# Patient Record
Sex: FEMALE | Race: WHITE | NOT HISPANIC OR LATINO
[De-identification: names, ages, dates, MRNs, and addresses within clinical notes are randomized per-mention and may not be internally consistent; named-entity substitution may affect disease eponyms.]

---

## 2020-07-13 ENCOUNTER — APPOINTMENT (OUTPATIENT)
Dept: GASTROENTEROLOGY | Facility: CLINIC | Age: 50
End: 2020-07-13

## 2020-10-30 ENCOUNTER — APPOINTMENT (OUTPATIENT)
Dept: INTERNAL MEDICINE | Facility: CLINIC | Age: 50
End: 2020-10-30

## 2020-11-20 ENCOUNTER — NON-APPOINTMENT (OUTPATIENT)
Age: 50
End: 2020-11-20

## 2020-11-20 ENCOUNTER — APPOINTMENT (OUTPATIENT)
Dept: INTERNAL MEDICINE | Facility: CLINIC | Age: 50
End: 2020-11-20
Payer: COMMERCIAL

## 2020-11-20 VITALS
OXYGEN SATURATION: 96 % | SYSTOLIC BLOOD PRESSURE: 136 MMHG | HEIGHT: 64 IN | DIASTOLIC BLOOD PRESSURE: 70 MMHG | WEIGHT: 139.5 LBS | HEART RATE: 71 BPM | BODY MASS INDEX: 23.82 KG/M2 | TEMPERATURE: 97.6 F | RESPIRATION RATE: 16 BRPM

## 2020-11-20 DIAGNOSIS — Z72.89 OTHER PROBLEMS RELATED TO LIFESTYLE: ICD-10-CM

## 2020-11-20 DIAGNOSIS — Z20.828 CONTACT WITH AND (SUSPECTED) EXPOSURE TO OTHER VIRAL COMMUNICABLE DISEASES: ICD-10-CM

## 2020-11-20 DIAGNOSIS — Z86.03 PERSONAL HISTORY OF NEOPLASM OF UNCERTAIN BEHAVIOR: ICD-10-CM

## 2020-11-20 PROCEDURE — 93000 ELECTROCARDIOGRAM COMPLETE: CPT | Mod: 59

## 2020-11-20 PROCEDURE — G0442 ANNUAL ALCOHOL SCREEN 15 MIN: CPT | Mod: NC

## 2020-11-20 PROCEDURE — 36415 COLL VENOUS BLD VENIPUNCTURE: CPT

## 2020-11-20 PROCEDURE — 99396 PREV VISIT EST AGE 40-64: CPT | Mod: 25

## 2020-11-22 PROBLEM — Z86.03 HISTORY OF NEOPLASM OF BLADDER: Status: RESOLVED | Noted: 2020-11-20 | Resolved: 2020-11-22

## 2020-11-22 PROBLEM — Z72.89 CONSUMES ALCOHOL: Status: ACTIVE | Noted: 2020-11-20

## 2020-11-22 NOTE — HISTORY OF PRESENT ILLNESS
[FreeTextEntry1] : annual wellness visit  [de-identified] : Patient is a 50 year old female with a past medical history as below who presents for an annual wellness visit. Patient is not currently taking any prescription medications, but is taking Vitamin D (400 IU) and a Multivitamin. Patient has seen [facility] in the past year. She is up-to-date with annual breast imaging (mammogram and breast US). Patient states screening colonoscopy is scheduled for early December with gastroenterologist, Dr. Storey. Patient notes right foot, 3rd digit pain that started 1 year ago. She has had imaging of the right foot (X-Ray/MRI) per an orthopedic foot specialist. The pain was diagnosed as secondary to a stress fracture and avascular necrosis. She has been using an orthopedic boot. Patient intermittently notes back pain secondary to lumbar disc herniation. She states PT has helped. Patient notes joint pain/swelling, most noticeably in her hands and ankles. Patient states she maintains a well-balanced diet and maintains an active lifestyle (yoga). Patient is not interested in receiving the flu vaccine or Tdap (Adacel) Vaccine today. She inquires about testing for COVID-19 antibodies with blood work today.

## 2020-11-22 NOTE — ASSESSMENT
[FreeTextEntry1] : Patient is a 50 year old female with a past medical history as above who presents for an annual wellness visit.

## 2020-11-22 NOTE — REVIEW OF SYSTEMS
[Negative] : Heme/Lymph [Joint Pain] : joint pain [Back Pain] : back pain [Joint Swelling] : joint swelling [FreeTextEntry9] : right foot, 3rd digit pain

## 2020-11-22 NOTE — ADDENDUM
[FreeTextEntry1] : I, Alvaro Byrd, acted solely as scribe for Dr. Parminder Sales DO on this date 11/20/2020 11:10AM .\par \par All medical record entries made by the Scribe were at my, Dr. Parminder Sales DO direction and personally dictated by me on 11/20/2020 11:10AM. I have reviewed the chart and agree that the record accurately reflects my personal performance of the history, physical exam, assessment and plan. I have also personally directed, reviewed and agreed with the chart.\par

## 2020-11-22 NOTE — HEALTH RISK ASSESSMENT
[Yes] : Yes [2 - 3 times a week (3 pts)] : 2 - 3  times a week (3 points) [1 or 2 (0 pts)] : 1 or 2 (0 points) [Never (0 pts)] : Never (0 points) [No] : In the past 12 months have you used drugs other than those required for medical reasons? No [0] : 2) Feeling down, depressed, or hopeless: Not at all (0) [] : No [Audit-CScore] : 3 [de-identified] : Yoga, active lifestyle.  [de-identified] : Regular.  [[id number]] : 0 [MammogramComments] : Results of last mammogram and breast US to be requested. [ColonoscopyComments] : Scheduled with Dr. Storey.

## 2020-11-22 NOTE — PLAN
[FreeTextEntry1] : Pulmonary\par former smoker - check EKG (results as above) - continue smoking cessation\par Gynecology\par Results of last mammogram and breast US to be requested\par Gastroenterology\par screening colonoscopy - patient to follow up with gastroenterologist, Dr. Storey next month \par Musculoskeletal\par right foot, 3rd digit pain - continue using orthopedic boot - continue to follow up with orthopedist \par Immunization\par flu vaccine - discussed, refused\par Tdap (Adacel) Vaccine - discussed, refused\par Infectious Disease\par check COVID-19 IgG antibody\par \par check EKG (results as above), female panel, hemoglobin A1C, COVID-19 IgG antibody, and UA

## 2020-11-29 LAB
25(OH)D3 SERPL-MCNC: 51.6 NG/ML
ALBUMIN SERPL ELPH-MCNC: 4.6 G/DL
ALP BLD-CCNC: 53 U/L
ALT SERPL-CCNC: 13 U/L
ANION GAP SERPL CALC-SCNC: 10 MMOL/L
APPEARANCE: CLEAR
AST SERPL-CCNC: 16 U/L
BASOPHILS # BLD AUTO: 0.05 K/UL
BASOPHILS NFR BLD AUTO: 1 %
BILIRUB SERPL-MCNC: 0.5 MG/DL
BILIRUBIN URINE: NEGATIVE
BLOOD URINE: NORMAL
BUN SERPL-MCNC: 19 MG/DL
CALCIUM SERPL-MCNC: 9.6 MG/DL
CHLORIDE SERPL-SCNC: 101 MMOL/L
CHOLEST SERPL-MCNC: 270 MG/DL
CO2 SERPL-SCNC: 27 MMOL/L
COLOR: YELLOW
CREAT SERPL-MCNC: 0.68 MG/DL
EOSINOPHIL # BLD AUTO: 0.09 K/UL
EOSINOPHIL NFR BLD AUTO: 1.8 %
ESTIMATED AVERAGE GLUCOSE: 103 MG/DL
GLUCOSE QUALITATIVE U: NEGATIVE
GLUCOSE SERPL-MCNC: 93 MG/DL
HBA1C MFR BLD HPLC: 5.2 %
HCT VFR BLD CALC: 41.7 %
HDLC SERPL-MCNC: 89 MG/DL
HGB BLD-MCNC: 13.6 G/DL
IMM GRANULOCYTES NFR BLD AUTO: 0.4 %
KETONES URINE: NEGATIVE
LDLC SERPL CALC-MCNC: 173 MG/DL
LEUKOCYTE ESTERASE URINE: NEGATIVE
LYMPHOCYTES # BLD AUTO: 1.83 K/UL
LYMPHOCYTES NFR BLD AUTO: 36.9 %
MAN DIFF?: NORMAL
MCHC RBC-ENTMCNC: 31.9 PG
MCHC RBC-ENTMCNC: 32.6 GM/DL
MCV RBC AUTO: 97.9 FL
MONOCYTES # BLD AUTO: 0.46 K/UL
MONOCYTES NFR BLD AUTO: 9.3 %
NEUTROPHILS # BLD AUTO: 2.51 K/UL
NEUTROPHILS NFR BLD AUTO: 50.6 %
NITRITE URINE: NEGATIVE
NONHDLC SERPL-MCNC: 181 MG/DL
PH URINE: 6.5
PLATELET # BLD AUTO: 414 K/UL
POTASSIUM SERPL-SCNC: 4.4 MMOL/L
PROT SERPL-MCNC: 7.1 G/DL
PROTEIN URINE: NEGATIVE
RBC # BLD: 4.26 M/UL
RBC # FLD: 14.7 %
SARS-COV-2 IGG SERPL IA-ACNC: 0.1 INDEX
SARS-COV-2 IGG SERPL QL IA: NEGATIVE
SODIUM SERPL-SCNC: 138 MMOL/L
SPECIFIC GRAVITY URINE: 1.02
TRIGL SERPL-MCNC: 43 MG/DL
TSH SERPL-ACNC: 2.42 UIU/ML
UROBILINOGEN URINE: NORMAL
WBC # FLD AUTO: 4.96 K/UL

## 2020-12-01 ENCOUNTER — APPOINTMENT (OUTPATIENT)
Dept: GASTROENTEROLOGY | Facility: CLINIC | Age: 50
End: 2020-12-01
Payer: COMMERCIAL

## 2020-12-01 ENCOUNTER — NON-APPOINTMENT (OUTPATIENT)
Age: 50
End: 2020-12-01

## 2020-12-01 VITALS
BODY MASS INDEX: 23.56 KG/M2 | DIASTOLIC BLOOD PRESSURE: 80 MMHG | OXYGEN SATURATION: 100 % | WEIGHT: 138 LBS | HEART RATE: 66 BPM | SYSTOLIC BLOOD PRESSURE: 127 MMHG | HEIGHT: 64 IN

## 2020-12-01 DIAGNOSIS — M87.00 IDIOPATHIC ASEPTIC NECROSIS OF UNSPECIFIED BONE: ICD-10-CM

## 2020-12-01 PROCEDURE — 99072 ADDL SUPL MATRL&STAF TM PHE: CPT

## 2020-12-01 PROCEDURE — 99204 OFFICE O/P NEW MOD 45 MIN: CPT

## 2020-12-01 NOTE — PHYSICAL EXAM
[General Appearance - Alert] : alert [General Appearance - In No Acute Distress] : in no acute distress [General Appearance - Well Nourished] : well nourished [General Appearance - Well Developed] : well developed [Sclera] : the sclera and conjunctiva were normal [Extraocular Movements] : extraocular movements were intact [Outer Ear] : the ears and nose were normal in appearance [Both Tympanic Membranes Were Examined] : both tympanic membranes were normal [Neck Appearance] : the appearance of the neck was normal [Neck Cervical Mass (___cm)] : no neck mass was observed [Auscultation Breath Sounds / Voice Sounds] : lungs were clear to auscultation bilaterally [Heart Rate And Rhythm] : heart rate was normal and rhythm regular [Heart Sounds] : normal S1 and S2 [Heart Sounds Gallop] : no gallops [Murmurs] : no murmurs [Heart Sounds Pericardial Friction Rub] : no pericardial rub [Bowel Sounds] : normal bowel sounds [Abdomen Soft] : soft [Abdomen Tenderness] : non-tender [Abdomen Mass (___ Cm)] : no abdominal mass palpated [Cervical Lymph Nodes Enlarged Posterior Bilaterally] : posterior cervical [Cervical Lymph Nodes Enlarged Anterior Bilaterally] : anterior cervical [Supraclavicular Lymph Nodes Enlarged Bilaterally] : supraclavicular [Abnormal Walk] : normal gait [Nail Clubbing] : no clubbing  or cyanosis of the fingernails [Skin Color & Pigmentation] : normal skin color and pigmentation [] : no rash [Oriented To Time, Place, And Person] : oriented to person, place, and time [Impaired Insight] : insight and judgment were intact [Affect] : the affect was normal

## 2020-12-01 NOTE — ASSESSMENT
[FreeTextEntry1] : IMPRESSION: \par 1.  Screening colonoscopy - here for evaluation\par 2.  Has never had a colonoscopy or other screening test  \par 3.  No digestive cancers in the family\par 4.  Hx of bladder tumor in her 30s\par \par \par PLAN: \par I had a long discuss with the patient regarding colon cancer in the [place].    \par \par We reviewed risk factors for colon cancer which include age, having a family hx of colon cancer, cancer syndromes, personal hx of inflammatory disease, having comorbidities such as obesity, DM, cardiac disease, having nicotine addiction, alcohol addiction, etc.  \par \par We reviewed the screening tests for colon cancer prevention.  We discussed screening tests such as stool test, CT scans such as CT colonography, and a colonoscopy.    Patient was made aware that despite these screening test, a cancerous lesion can still be missed.  The gold standard test is a screening colonoscopy.\par \par I have advised the patient to arrange for a colonoscopy to rule out colon polyps, colorectal cancer etc. under monitored anesthesia care.  Risks such as perforation  (4 in 10,000) requiring surgery, bleeding (8 in 10,000), infection, diverticulitis, colitis, missed colon cancer (2% to 6%), internal organ injury, etc, risks of bowel prep including colitis, syncope, adverse reaction to medication etc. and risks of anesthesia including cardiopulmonary compromise were discussed with patient.  Patient verbalized understanding and agrees to proceed with the planned procedure.\par

## 2020-12-01 NOTE — HISTORY OF PRESENT ILLNESS
[de-identified] : 51 y/o woman with hx of bladder tumor in her 30 s/p resection who presents for a colonoscopy. \par \par No colonoscopy in past. \par \par No abdominal pain.\par No constipation. \par No diarrhea. \par No change in bowel habits. \par No melena and n hematochezia. \par No loss of appetite, no loss of weight. \par \par No heartburn\par No dysphagia. \par No odynophagia.  \par No jaundice. \par \par \par \par Brother had Prostate cancer.  Father had liver disease but unsure etiology.  Patient denies family history of digestive cancers.\par \par Third toe on right foot painful.   Has arthritis - painful joints, back pain.  Feels stiff in joints All other review of systems are negative.  Denies cardiac symptoms.\par

## 2021-02-11 ENCOUNTER — LABORATORY RESULT (OUTPATIENT)
Age: 51
End: 2021-02-11

## 2021-02-11 ENCOUNTER — APPOINTMENT (OUTPATIENT)
Dept: RHEUMATOLOGY | Facility: CLINIC | Age: 51
End: 2021-02-11
Payer: COMMERCIAL

## 2021-02-11 VITALS
DIASTOLIC BLOOD PRESSURE: 90 MMHG | OXYGEN SATURATION: 99 % | WEIGHT: 140 LBS | BODY MASS INDEX: 23.9 KG/M2 | HEIGHT: 64 IN | SYSTOLIC BLOOD PRESSURE: 130 MMHG | HEART RATE: 73 BPM

## 2021-02-11 PROCEDURE — 99204 OFFICE O/P NEW MOD 45 MIN: CPT | Mod: 25

## 2021-02-11 PROCEDURE — 36415 COLL VENOUS BLD VENIPUNCTURE: CPT

## 2021-02-11 PROCEDURE — 99072 ADDL SUPL MATRL&STAF TM PHE: CPT

## 2021-02-12 LAB
C3 SERPL-MCNC: 97 MG/DL
C4 SERPL-MCNC: 24 MG/DL
CENTROMERE IGG SER-ACNC: <0.2 CD:130001892
CRP SERPL-MCNC: <0.1 MG/DL
ENA RNP AB SER IA-ACNC: 1.6 AL
ENA SCL70 IGG SER IA-ACNC: <0.2 AL
ENA SM AB SER IA-ACNC: <0.2 AL
ERYTHROCYTE [SEDIMENTATION RATE] IN BLOOD BY WESTERGREN METHOD: 18 MM/HR
RHEUMATOID FACT SER QL: 11 IU/ML
TSH SERPL-ACNC: 2.56 UIU/ML

## 2021-02-13 LAB
ANA SER IF-ACNC: NEGATIVE
B BURGDOR IGG+IGM SER QL IB: NORMAL
CCP AB SER IA-ACNC: 89 UNITS
DSDNA AB SER-ACNC: <12 IU/ML
RF+CCP IGG SER-IMP: ABNORMAL

## 2021-02-15 NOTE — HISTORY OF PRESENT ILLNESS
[Anorexia] : no anorexia [FreeTextEntry1] : 49 yo here for polyarthralgias\par \par 2018 - age 38 - started to feel pain in the fingers.   also developed mild swelling in the right middle finger.  ortho treated it with cortisone shot with little improvement.   has lived since then with pain and joint stiffness.  Stiffness in the joint is in the AM mostly.  occasionally gets back pain, but not prolonged in the aM\par -  other joints without issues or pain except for foot.   was exercising on a bike and hit the toe.   was treated with NSAID for 3 months as well as steroids - without improvement.   Was subsequently found to have a stress fracture in the toe and chronic AVN.    Was treated with prolonged boot - 6 weeks.  just had it removed a few days ago - and now the swelling is gone. 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\par - has had back pain since young - \par - the middle fingers are more swollen - sometimes it gets stuck\par \par Rheum ROS\par - denies dactylitis in other joints, no toe swelling\par - denies heat or redness in the joints \par - no h/o psoriasis, IBD, STD, infectious diarrhea or inflammatory eye disease\par - endorses 1 episode of white/blue color begum of the middle finger - it was cold - quickly rewarmed\par - no photosensitivity, oral ulcers, pulmonary complaints or frothy urine\par - strong family history of autoimmunity (mom)\par -  - all three first trimester miscarriage - no heart beat at 10 weeks - - delivery  okay - no preeclampsia - \par \par PMHX\par - bladder cyst age 30 - cancerous and surgically remmoved -  never needed chemo or radiaon\par \par PSHX\par \par \par  [Weight Loss] : no weight loss [Malaise] : no malaise [Fever] : no fever [Fatigue] : no fatigue [Depression] : no depression [Malar Facial Rash] : no malar facial rash [Skin Lesions] : no lesions [Oral Ulcers] : no oral ulcers [Dry Mouth] : no dry mouth [Chest Pain] : no chest pain [Shortness of Breath] : no shortness of breath [Joint Warmth] : no joint warmth [Arthralgias] : arthralgias [Difficulty Standing] : no difficulty standing [Muscle Spasms] : no muscle spasms [Eye Pain] : no eye pain [Eye Redness] : no eye redness [Dry Eyes] : no dry eyes

## 2021-02-15 NOTE — ASSESSMENT
[FreeTextEntry1] : 51 yo family h/o autoimmunity (SLE< RA< PsA) here with polyarthralgias and ? dacytlitis\par \par #finger pain and swelling - chronic.  ? 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

## 2021-02-15 NOTE — PHYSICAL EXAM
[General Appearance - Alert] : alert [General Appearance - In No Acute Distress] : in no acute distress [General Appearance - Well Nourished] : well nourished [General Appearance - Well-Appearing] : healthy appearing [General Appearance - Well Developed] : well developed [Oropharynx] : the oropharynx was normal [Outer Ear] : the ears and nose were normal in appearance [Neck Appearance] : the appearance of the neck was normal [Neck Cervical Mass (___cm)] : no neck mass was observed [Jugular Venous Distention Increased] : there was no jugular-venous distention [Thyroid Diffuse Enlargement] : the thyroid was not enlarged [Thyroid Nodule] : there were no palpable thyroid nodules [Auscultation Breath Sounds / Voice Sounds] : lungs were clear to auscultation bilaterally [Heart Rate And Rhythm] : heart rate was normal and rhythm regular [Heart Sounds Gallop] : no gallops [Heart Sounds] : normal S1 and S2 [Murmurs] : no murmurs [Heart Sounds Pericardial Friction Rub] : no pericardial rub [Edema] : there was no peripheral edema [Full Pulse] : the pedal pulses are present [Cervical Lymph Nodes Enlarged Posterior Bilaterally] : posterior cervical [Cervical Lymph Nodes Enlarged Anterior Bilaterally] : anterior cervical [Supraclavicular Lymph Nodes Enlarged Bilaterally] : supraclavicular [No CVA Tenderness] : no ~M costovertebral angle tenderness [No Spinal Tenderness] : no spinal tenderness [Skin Color & Pigmentation] : normal skin color and pigmentation [FreeTextEntry1] : unable to bend the 3rd finger biltearlly - dactylitis right finger [Skin Turgor] : normal skin turgor [Motor Exam] : the motor exam was normal [] : no rash [No Focal Deficits] : no focal deficits [Oriented To Time, Place, And Person] : oriented to person, place, and time [Affect] : the affect was normal [Impaired Insight] : insight and judgment were intact

## 2021-02-15 NOTE — CONSULT LETTER
[Dear  ___] : Dear  [unfilled], [Courtesy Letter:] : I had the pleasure of seeing your patient, [unfilled], in my office today. [Please see my note below.] : Please see my note below. [Consult Closing:] : Thank you very much for allowing me to participate in the care of this patient.  If you have any questions, please do not hesitate to contact me. [Sincerely,] : Sincerely, [FreeTextEntry2] : Dr. Rodrigues Sayedy

## 2021-02-16 LAB — CK SERPL-CCNC: 127 U/L

## 2021-02-17 ENCOUNTER — APPOINTMENT (OUTPATIENT)
Dept: GASTROENTEROLOGY | Facility: HOSPITAL | Age: 51
End: 2021-02-17

## 2021-02-17 LAB — RNA POLYMERASE III IGG: 4 UNITS

## 2021-02-22 LAB — HLA-B27 RELATED AG QL: NEGATIVE

## 2021-02-23 ENCOUNTER — APPOINTMENT (OUTPATIENT)
Dept: INTERNAL MEDICINE | Facility: CLINIC | Age: 51
End: 2021-02-23

## 2021-03-08 ENCOUNTER — RESULT REVIEW (OUTPATIENT)
Age: 51
End: 2021-03-08

## 2021-03-08 ENCOUNTER — APPOINTMENT (OUTPATIENT)
Dept: MRI IMAGING | Facility: CLINIC | Age: 51
End: 2021-03-08
Payer: COMMERCIAL

## 2021-03-08 ENCOUNTER — OUTPATIENT (OUTPATIENT)
Dept: OUTPATIENT SERVICES | Facility: HOSPITAL | Age: 51
LOS: 1 days | End: 2021-03-08
Payer: COMMERCIAL

## 2021-03-08 DIAGNOSIS — M79.641 PAIN IN RIGHT HAND: ICD-10-CM

## 2021-03-08 PROCEDURE — 73130 X-RAY EXAM OF HAND: CPT | Mod: 26,50

## 2021-03-08 PROCEDURE — 73218 MRI UPPER EXTREMITY W/O DYE: CPT

## 2021-03-08 PROCEDURE — 73218 MRI UPPER EXTREMITY W/O DYE: CPT | Mod: 26,LT,76

## 2021-03-08 PROCEDURE — 73130 X-RAY EXAM OF HAND: CPT

## 2021-03-09 ENCOUNTER — NON-APPOINTMENT (OUTPATIENT)
Age: 51
End: 2021-03-09

## 2021-03-09 ENCOUNTER — APPOINTMENT (OUTPATIENT)
Dept: RHEUMATOLOGY | Facility: CLINIC | Age: 51
End: 2021-03-09
Payer: COMMERCIAL

## 2021-03-09 VITALS
DIASTOLIC BLOOD PRESSURE: 85 MMHG | HEART RATE: 63 BPM | OXYGEN SATURATION: 98 % | SYSTOLIC BLOOD PRESSURE: 125 MMHG | BODY MASS INDEX: 23.9 KG/M2 | HEIGHT: 64 IN | WEIGHT: 140 LBS

## 2021-03-09 PROCEDURE — 99214 OFFICE O/P EST MOD 30 MIN: CPT

## 2021-03-09 PROCEDURE — 99072 ADDL SUPL MATRL&STAF TM PHE: CPT

## 2021-03-09 NOTE — PHYSICAL EXAM
[General Appearance - Alert] : alert [General Appearance - In No Acute Distress] : in no acute distress [General Appearance - Well Nourished] : well nourished [General Appearance - Well Developed] : well developed [General Appearance - Well-Appearing] : healthy appearing [Skin Color & Pigmentation] : normal skin color and pigmentation [Skin Turgor] : normal skin turgor [] : no rash [Oriented To Time, Place, And Person] : oriented to person, place, and time [Impaired Insight] : insight and judgment were intact [Affect] : the affect was normal [FreeTextEntry1] : slight swelling over MCP palmar side - decreased flexion bilaterally at the PIP and MCP joints - cool and no synovitis at the joint itslef

## 2021-03-09 NOTE — HISTORY OF PRESENT ILLNESS
[Arthralgias] : arthralgias [FreeTextEntry1] : 49 yo here for polyarthralgias\par \par 2018 - age 38 - started to feel pain in the fingers.   also developed mild swelling in the right middle finger.  ortho treated it with cortisone shot with little improvement.   has lived since then with pain and joint stiffness.  Stiffness in the joint is in the AM mostly.  occasionally gets back pain, but not prolonged in the aM\par -  other joints without issues or pain except for foot.   was exercising on a bike and hit the toe.   was treated with NSAID for 3 months as well as steroids - without improvement.   Was subsequently found to have a stress fracture in the toe and chronic AVN.    Was treated with prolonged boot - 6 weeks.  just had it removed a few days ago - and now the swelling is gone. 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\par - has had back pain since young - \par - the middle fingers are more swollen - sometimes it gets stuck\par \par Rheum ROS\par - denies dactylitis in other joints, no toe swelling\par - denies heat or redness in the joints \par - no h/o psoriasis, IBD, STD, infectious diarrhea or inflammatory eye disease\par - endorses 1 episode of white/blue color begum of the middle finger - it was cold - quickly rewarmed\par - no photosensitivity, oral ulcers, pulmonary complaints or frothy urine\par - strong family history of autoimmunity (mom)\par -  - all three first trimester miscarriage - no heart beat at 10 weeks - - delivery  okay - no preeclampsia - \par \par PMHX\par - bladder cyst age 30 - cancerous and surgically remmoved -  never needed chemo or radiaon\par \par PSHX\par \par \par  [Anorexia] : no anorexia [Weight Loss] : no weight loss [Malaise] : no malaise [Fever] : no fever [Fatigue] : no fatigue [Depression] : no depression [Malar Facial Rash] : no malar facial rash [Skin Lesions] : no lesions [Oral Ulcers] : no oral ulcers [Dry Mouth] : no dry mouth [Shortness of Breath] : no shortness of breath [Chest Pain] : no chest pain [Joint Warmth] : no joint warmth [Difficulty Standing] : no difficulty standing [Muscle Spasms] : no muscle spasms [Eye Pain] : no eye pain [Eye Redness] : no eye redness [Dry Eyes] : no dry eyes

## 2021-03-09 NOTE — ASSESSMENT
[FreeTextEntry1] : 51 yo family h/o autoimmunity (SLE< RA< PsA) here with polyarthralgias\par \par #finger pain and swelling - chronic.  has had steroids, NSAID, exercises and injections over the years without improvement.  has stifness in the AM but only a few minutes - not prolonged.  inflammatory markers are negative. MRI without synovitis but with some tendinopathy and linear tears/scarring and ganglion. \par - rec hand eval for further input\par \par #RP. no digital ulcers/gangrene. \par - serologic workup negative for scl or lupus.  RNP and CCP low positive though and without pulmonary or muscle complaints/weakness.   \par - rec CPK/myomarker for confirmation - as RNP can be associated with ILD, rec baseline CXR, ct chest, PFT and pulmonary evaluation \par - continue hand warming for now - no meds

## 2021-03-15 ENCOUNTER — TRANSCRIPTION ENCOUNTER (OUTPATIENT)
Age: 51
End: 2021-03-15

## 2021-03-16 ENCOUNTER — APPOINTMENT (OUTPATIENT)
Dept: ORTHOPEDIC SURGERY | Facility: CLINIC | Age: 51
End: 2021-03-16
Payer: COMMERCIAL

## 2021-03-16 VITALS — SYSTOLIC BLOOD PRESSURE: 142 MMHG | DIASTOLIC BLOOD PRESSURE: 95 MMHG | HEART RATE: 61 BPM

## 2021-03-16 PROCEDURE — 99072 ADDL SUPL MATRL&STAF TM PHE: CPT

## 2021-03-16 PROCEDURE — 20550 NJX 1 TENDON SHEATH/LIGAMENT: CPT | Mod: F2

## 2021-03-16 PROCEDURE — 99203 OFFICE O/P NEW LOW 30 MIN: CPT | Mod: 25

## 2021-03-17 ENCOUNTER — NON-APPOINTMENT (OUTPATIENT)
Age: 51
End: 2021-03-17

## 2021-03-17 RX ORDER — SODIUM SULFATE, POTASSIUM SULFATE, MAGNESIUM SULFATE 17.5; 3.13; 1.6 G/ML; G/ML; G/ML
17.5-3.13-1.6 SOLUTION, CONCENTRATE ORAL
Qty: 1 | Refills: 0 | Status: DISCONTINUED | COMMUNITY
Start: 2020-12-01 | End: 2021-03-17

## 2021-03-18 ENCOUNTER — APPOINTMENT (OUTPATIENT)
Dept: PULMONOLOGY | Facility: CLINIC | Age: 51
End: 2021-03-18
Payer: COMMERCIAL

## 2021-03-18 VITALS
WEIGHT: 140 LBS | BODY MASS INDEX: 23.9 KG/M2 | SYSTOLIC BLOOD PRESSURE: 153 MMHG | DIASTOLIC BLOOD PRESSURE: 93 MMHG | OXYGEN SATURATION: 99 % | HEIGHT: 64 IN | HEART RATE: 64 BPM

## 2021-03-18 DIAGNOSIS — Z87.891 PERSONAL HISTORY OF NICOTINE DEPENDENCE: ICD-10-CM

## 2021-03-18 PROCEDURE — 99072 ADDL SUPL MATRL&STAF TM PHE: CPT

## 2021-03-18 PROCEDURE — 99203 OFFICE O/P NEW LOW 30 MIN: CPT | Mod: 25

## 2021-03-18 PROCEDURE — 71046 X-RAY EXAM CHEST 2 VIEWS: CPT

## 2021-03-19 ENCOUNTER — APPOINTMENT (OUTPATIENT)
Dept: INTERNAL MEDICINE | Facility: CLINIC | Age: 51
End: 2021-03-19
Payer: COMMERCIAL

## 2021-03-19 VITALS
SYSTOLIC BLOOD PRESSURE: 128 MMHG | WEIGHT: 150 LBS | HEIGHT: 64 IN | TEMPERATURE: 97.3 F | OXYGEN SATURATION: 98 % | HEART RATE: 84 BPM | DIASTOLIC BLOOD PRESSURE: 82 MMHG | RESPIRATION RATE: 16 BRPM | BODY MASS INDEX: 25.61 KG/M2

## 2021-03-19 PROCEDURE — 99072 ADDL SUPL MATRL&STAF TM PHE: CPT

## 2021-03-19 PROCEDURE — 99214 OFFICE O/P EST MOD 30 MIN: CPT | Mod: 25

## 2021-03-19 PROCEDURE — 36415 COLL VENOUS BLD VENIPUNCTURE: CPT

## 2021-03-19 NOTE — HISTORY OF PRESENT ILLNESS
[Former] : former [Never] : never [TextBox_4] : JAYLAN PARRA is a 51 year old female who presents for formal xray/ pft\par ongoing work up for autoimmune/ ILD\par \par she denies any cough, wheezing, dyspnea\par remote history of smoking\par currently seeing Dr. Uriarte for polyarthritis\par \par

## 2021-03-19 NOTE — CONSULT LETTER
[Dear  ___] : Dear  [unfilled], [Consult Letter:] : I had the pleasure of evaluating your patient, [unfilled]. [Please see my note below.] : Please see my note below. [Consult Closing:] : Thank you very much for allowing me to participate in the care of this patient.  If you have any questions, please do not hesitate to contact me. [Sincerely,] : Sincerely, [FreeTextEntry3] : Kelsi Spencer D.O.\par [facility]\par [phone]\par [email or web address]\par  [DrJose  ___] : Dr. GUZMÁN

## 2021-03-19 NOTE — CONSULT LETTER
[Dear  ___] : Dear  [unfilled], [Consult Letter:] : I had the pleasure of evaluating your patient, [unfilled]. [Please see my note below.] : Please see my note below. [Consult Closing:] : Thank you very much for allowing me to participate in the care of this patient.  If you have any questions, please do not hesitate to contact me. [Sincerely,] : Sincerely, [FreeTextEntry3] : Kelsi Spencer D.O.\par [facility]\par [phone]\par [email or web address]\par  [DrJose  ___] : Dr. GUZMÁN

## 2021-03-19 NOTE — ASSESSMENT
[FreeTextEntry1] : xray is unremarkable\par COVID swab done\par formal pfts next week\par she already has  CT chest noncontrast scheduled\par carlos f/u with her after PFTS & CT chest to review both\par \par all qx answered\par \par

## 2021-03-19 NOTE — PHYSICAL EXAM
[No Acute Distress] : no acute distress [Well Nourished] : well nourished [Well Developed] : well developed [Normal Rate/Rhythm] : normal rate/rhythm [Normal S1, S2] : normal s1, s2 [No Resp Distress] : no resp distress [Clear to Auscultation Bilaterally] : clear to auscultation bilaterally [Oriented x3] : oriented x3

## 2021-03-20 LAB — SARS-COV-2 N GENE NPH QL NAA+PROBE: NOT DETECTED

## 2021-03-20 NOTE — ASSESSMENT
[FreeTextEntry1] : Patient is a 51 year old female with a past medical history as above who presents for fasting blood work and general follow-up.\par

## 2021-03-20 NOTE — REVIEW OF SYSTEMS
[Negative] : Heme/Lymph [Joint Pain] : joint pain [Joint Stiffness] : joint stiffness [FreeTextEntry9] : right foot, 3rd digit pain

## 2021-03-20 NOTE — HEALTH RISK ASSESSMENT
[Yes] : Yes [2 - 3 times a week (3 pts)] : 2 - 3  times a week (3 points) [1 or 2 (0 pts)] : 1 or 2 (0 points) [Never (0 pts)] : Never (0 points) [No] : In the past 12 months have you used drugs other than those required for medical reasons? No [0] : 2) Feeling down, depressed, or hopeless: Not at all (0) [] : No [Audit-CScore] : 3 [de-identified] : Yoga, active lifestyle.  [de-identified] : Regular.  [[id number]] : 0 [MammogramComments] : Results of last mammogram and breast US to be requested. [ColonoscopyComments] : Scheduled with Dr. Storey.

## 2021-03-20 NOTE — ADDENDUM
[FreeTextEntry1] : I, Alvaro Byrd, acted solely as scribe for Dr. Parminder Sales DO on this date 03/19/2021  8:50AM .\par \par All medical record entries made by the Scribe were at my, Dr. Parminder Sales DO direction and personally dictated by me on 03/19/2021  8:50AM. I have reviewed the chart and agree that the record accurately reflects my personal performance of the history, physical exam, assessment and plan. I have also personally directed, reviewed and agreed with the chart.\par

## 2021-03-20 NOTE — PLAN
[FreeTextEntry1] : Pulmonary\par Results of CXR pending; CT chest scheduled as per rheumatologist, Dr. Velasquez to r/o ILD - follow up with pulmonologist, Dr. Spencer for PFT\par Rheumatology\par presence of anti-RNP antibodies - check CPK - follow up with rheumatologist, Dr. Rea\par Cardiology\par hyperlipidemia - continue low cholesterol/low fat diet - check FLP\par Hematology\par thrombocytosis - recheck CBC\par Gastroenterology\par screening colonoscopy - scheduled for May 2021 with gastroenterologist, Dr. Gee\par \par check CBC, FLP, and CPK

## 2021-03-22 ENCOUNTER — RESULT CHARGE (OUTPATIENT)
Age: 51
End: 2021-03-22

## 2021-03-22 ENCOUNTER — NON-APPOINTMENT (OUTPATIENT)
Age: 51
End: 2021-03-22

## 2021-03-22 LAB
BASOPHILS # BLD AUTO: 0.06 K/UL
BASOPHILS NFR BLD AUTO: 0.8 %
CHOLEST SERPL-MCNC: 247 MG/DL
CK SERPL-CCNC: 72 U/L
EOSINOPHIL # BLD AUTO: 0.05 K/UL
EOSINOPHIL NFR BLD AUTO: 0.7 %
HCT VFR BLD CALC: 43 %
HDLC SERPL-MCNC: 86 MG/DL
HGB BLD-MCNC: 13.3 G/DL
IMM GRANULOCYTES NFR BLD AUTO: 0.1 %
LDLC SERPL CALC-MCNC: 144 MG/DL
LYMPHOCYTES # BLD AUTO: 3.04 K/UL
LYMPHOCYTES NFR BLD AUTO: 40.5 %
MAN DIFF?: NORMAL
MCHC RBC-ENTMCNC: 30.7 PG
MCHC RBC-ENTMCNC: 30.9 GM/DL
MCV RBC AUTO: 99.3 FL
MONOCYTES # BLD AUTO: 0.71 K/UL
MONOCYTES NFR BLD AUTO: 9.5 %
NEUTROPHILS # BLD AUTO: 3.64 K/UL
NEUTROPHILS NFR BLD AUTO: 48.4 %
NONHDLC SERPL-MCNC: 161 MG/DL
PLATELET # BLD AUTO: 423 K/UL
RBC # BLD: 4.33 M/UL
RBC # FLD: 15.1 %
TRIGL SERPL-MCNC: 88 MG/DL
WBC # FLD AUTO: 7.51 K/UL

## 2021-03-23 ENCOUNTER — APPOINTMENT (OUTPATIENT)
Dept: PULMONOLOGY | Facility: CLINIC | Age: 51
End: 2021-03-23
Payer: COMMERCIAL

## 2021-03-23 PROCEDURE — 88738 HGB QUANT TRANSCUTANEOUS: CPT

## 2021-03-23 PROCEDURE — 94010 BREATHING CAPACITY TEST: CPT

## 2021-03-23 PROCEDURE — 94729 DIFFUSING CAPACITY: CPT

## 2021-03-23 PROCEDURE — 99072 ADDL SUPL MATRL&STAF TM PHE: CPT

## 2021-03-23 PROCEDURE — 94727 GAS DIL/WSHOT DETER LNG VOL: CPT

## 2021-03-24 ENCOUNTER — APPOINTMENT (OUTPATIENT)
Dept: CT IMAGING | Facility: CLINIC | Age: 51
End: 2021-03-24
Payer: COMMERCIAL

## 2021-03-24 ENCOUNTER — OUTPATIENT (OUTPATIENT)
Dept: OUTPATIENT SERVICES | Facility: HOSPITAL | Age: 51
LOS: 1 days | End: 2021-03-24
Payer: COMMERCIAL

## 2021-03-24 DIAGNOSIS — R76.8 OTHER SPECIFIED ABNORMAL IMMUNOLOGICAL FINDINGS IN SERUM: ICD-10-CM

## 2021-03-24 PROCEDURE — 71250 CT THORAX DX C-: CPT | Mod: 26

## 2021-03-24 PROCEDURE — 71250 CT THORAX DX C-: CPT

## 2021-03-25 ENCOUNTER — APPOINTMENT (OUTPATIENT)
Dept: PULMONOLOGY | Facility: CLINIC | Age: 51
End: 2021-03-25
Payer: COMMERCIAL

## 2021-03-25 VITALS
HEART RATE: 73 BPM | WEIGHT: 145 LBS | SYSTOLIC BLOOD PRESSURE: 135 MMHG | HEIGHT: 64 IN | OXYGEN SATURATION: 98 % | BODY MASS INDEX: 24.75 KG/M2 | DIASTOLIC BLOOD PRESSURE: 89 MMHG

## 2021-03-25 PROCEDURE — 99072 ADDL SUPL MATRL&STAF TM PHE: CPT

## 2021-03-25 PROCEDURE — 99212 OFFICE O/P EST SF 10 MIN: CPT

## 2021-03-25 NOTE — HISTORY OF PRESENT ILLNESS
[Never] : never [TextBox_4] : JAYLAN PARRA is a 51 year old female who presents to review PFTs\par \par no resp complaints\par remains w/o cough, wheezing dyspnea\par \par sp CT chest to eval for ILD\par

## 2021-03-25 NOTE — CONSULT LETTER
[Dear  ___] : Dear  [unfilled], [Courtesy Letter:] : I had the pleasure of seeing your patient, [unfilled], in my office today. [Please see my note below.] : Please see my note below. [Consult Closing:] : Thank you very much for allowing me to participate in the care of this patient.  If you have any questions, please do not hesitate to contact me. [FreeTextEntry3] : Kelsi Spencer D.O.\par [facility]\par [phone]\par [email or web address]\par  [DrJose  ___] : Dr. GUZMÁN

## 2021-03-25 NOTE — PROCEDURE
[FreeTextEntry1] : pfts- normal luis m volume & DLCO\par \par CT Chest official report is pending however- no obvious nodules, no fibrotic changes, no bronchiectasis, no effusions\par overall unremarkbale parenchyma\par

## 2021-03-25 NOTE — ASSESSMENT
[FreeTextEntry1] : pfts indicate normal flow rate, no airway disease\par official CT chest is pending- prelim- no signs of ILD\par \par f/u with rheum- Dr. Uriarte

## 2021-03-29 ENCOUNTER — NON-APPOINTMENT (OUTPATIENT)
Age: 51
End: 2021-03-29

## 2021-04-12 ENCOUNTER — NON-APPOINTMENT (OUTPATIENT)
Age: 51
End: 2021-04-12

## 2021-04-28 ENCOUNTER — NON-APPOINTMENT (OUTPATIENT)
Age: 51
End: 2021-04-28

## 2021-05-02 DIAGNOSIS — Z01.818 ENCOUNTER FOR OTHER PREPROCEDURAL EXAMINATION: ICD-10-CM

## 2021-05-03 ENCOUNTER — APPOINTMENT (OUTPATIENT)
Dept: DISASTER EMERGENCY | Facility: CLINIC | Age: 51
End: 2021-05-03

## 2021-05-04 LAB — SARS-COV-2 N GENE NPH QL NAA+PROBE: NOT DETECTED

## 2021-05-05 ENCOUNTER — TRANSCRIPTION ENCOUNTER (OUTPATIENT)
Age: 51
End: 2021-05-05

## 2021-05-06 ENCOUNTER — OUTPATIENT (OUTPATIENT)
Dept: OUTPATIENT SERVICES | Facility: HOSPITAL | Age: 51
LOS: 1 days | End: 2021-05-06
Payer: COMMERCIAL

## 2021-05-06 ENCOUNTER — RESULT REVIEW (OUTPATIENT)
Age: 51
End: 2021-05-06

## 2021-05-06 ENCOUNTER — APPOINTMENT (OUTPATIENT)
Dept: GASTROENTEROLOGY | Facility: HOSPITAL | Age: 51
End: 2021-05-06

## 2021-05-06 DIAGNOSIS — Z12.11 ENCOUNTER FOR SCREENING FOR MALIGNANT NEOPLASM OF COLON: ICD-10-CM

## 2021-05-06 LAB — HCG UR QL: NEGATIVE — SIGNIFICANT CHANGE UP

## 2021-05-06 PROCEDURE — 88305 TISSUE EXAM BY PATHOLOGIST: CPT | Mod: 26

## 2021-05-06 PROCEDURE — 81025 URINE PREGNANCY TEST: CPT

## 2021-05-06 PROCEDURE — 45385 COLONOSCOPY W/LESION REMOVAL: CPT

## 2021-05-06 PROCEDURE — 45385 COLONOSCOPY W/LESION REMOVAL: CPT | Mod: PT

## 2021-05-06 PROCEDURE — 88305 TISSUE EXAM BY PATHOLOGIST: CPT

## 2021-05-09 ENCOUNTER — NON-APPOINTMENT (OUTPATIENT)
Age: 51
End: 2021-05-09

## 2021-06-17 ENCOUNTER — NON-APPOINTMENT (OUTPATIENT)
Age: 51
End: 2021-06-17

## 2021-06-22 DIAGNOSIS — B00.1 HERPESVIRAL VESICULAR DERMATITIS: ICD-10-CM

## 2021-09-20 ENCOUNTER — APPOINTMENT (OUTPATIENT)
Dept: ORTHOPEDIC SURGERY | Facility: CLINIC | Age: 51
End: 2021-09-20

## 2021-10-10 ENCOUNTER — TRANSCRIPTION ENCOUNTER (OUTPATIENT)
Age: 51
End: 2021-10-10

## 2021-10-26 ENCOUNTER — APPOINTMENT (OUTPATIENT)
Dept: ORTHOPEDIC SURGERY | Facility: CLINIC | Age: 51
End: 2021-10-26

## 2021-11-16 ENCOUNTER — APPOINTMENT (OUTPATIENT)
Dept: ORTHOPEDIC SURGERY | Facility: CLINIC | Age: 51
End: 2021-11-16
Payer: COMMERCIAL

## 2021-11-16 PROCEDURE — 99215 OFFICE O/P EST HI 40 MIN: CPT

## 2021-11-18 NOTE — PHYSICAL EXAM
[de-identified] : Right hand\par Long finger\par A1 pulley tenderness\par Patient unable and unwilling to fully flex long finger closer than 5 cm pulp to palm because of pain and guarding\par Ring finger slight A1 pulley tenderness\par Superficialis triggering, provocable\par Index, little finger A1 pulley is nontender with full flexion; no triggering\par No pertinent CMC, MP, PIP, or DIP joint contributory finding.  \par \par Left hand\par Long finger A1 pulley tenderness, less than right\par Active long finger flexion within 3 cm pulp to palm\par Patient resists passive full flexion because of pain and guarding\par Ring finger slight A1 pulley tenderness\par Superficialis triggering, provocable\par Index, little fingers without A1 pulley tenderness and full flexion\par No pertinent CMC, MP, PIP, or DIP joint contributory finding.\par \par Neurologic: Median, ulnar, and radial motor and sensory are intact. \par Skin: No cyanosis, clubbing, or rashes.\par Vascular: Radial pulses intact.\par Lymphatic: No streaking or epitrochlear adenopathy.\par The patient is awake, alert, and oriented. Affect appropriate. Cooperative.

## 2021-11-18 NOTE — PATIENT INSTRUCTIONS
[FreeTextEntry1] : HAND SURGERY PATIENTS\par Instructions: Trigger finger, CTR, de Quervain's, etc. \par \par 1. Maintain hand elevation for 24 to 48 hours. Elevation is one of the best ways to control pain. Swelling usually peaks during this period. After 48 hours, you do not need to maintain elevation if there is no "throbbing" when your hand is lowered. NOTE: Your hand does not have to be elevated continuously. \par \par 2. Bathing: Keep your dressing dry. You may wash exposed fingers with a washcloth. You may shower or bathe with a plastic bag protecting the dressing/cast. Once you've changed the dressing, you may bathe with a disposable glove or bag over the wound.\par \par 3. Dressing: Ask if you may change your dressing two days after surgery. If changing the dressing is allowed, apply 3" x 3" gauze and secured with 2 inch generic Coban. Change bandage as needed. May remove bandage and may use hand for ADL with no dressing, if there is no bleeding, inside the house. Once you change the dressing, you must reapply a new clean dressing at least once every day.\par \par 4. Exercise: It is important to move your fingers, shoulder, and elbow to prevent stiffness. Fully opening and closing your fingers several times per day to maintain full range of motion is essential. When you can readily open and close your fingers fully without pain, you may reduce the frequency of exercising. Nonetheless, it is important to exercise 3 to 4 times each day, even when movement becomes easy and painless. You may perform simple activities under 2 to 3 pounds, e.g., holding a phone, writing, simple keyboarding, using eating utensils. \par \par 5. Pain: Numbness from the nerve block (local anesthesia) usually lasts 4-8 hours, but sometimes lasts more than 24 hours. Once pain starts, take pain medicine as prescribed. Remember, elevation is one of the best ways to control pain. Pain is normal during and following exercise periods. If necessary, take the prescribed medicine. Ask if you can substitute Tylenol, Advil, or Aleve. Note: You must not take more than 4000mg of Tylenol in 24 hours.\par \par 6. Bleeding: Blood staining on your dressing is very common, as is the appearance of "black and blue on exposed fingers or arm.\par \par 7. Swelling: Some finger swelling usually occurs. Exercises and elevation will help control swelling. Ask if you may loosen the dressing. It is important to verify that you may do this.\par \par 8. Infection: Post operative infections are rare. If you get PROGRESSIVE redness, swelling, and pain for more than 24 hour that does not respond to elevation and rest, or if you start to run a fever, call the office: [phone].\par \par 9. Call to schedule a follow up appointment, even if you read this prior to surgery. Arrange follow up approximately one week post operatively, or at the time recommended by your surgeon. \par \par Thank you.\par Kt Rivas MD\par \par \par \par

## 2021-11-18 NOTE — DISCUSSION/SUMMARY
[FreeTextEntry1] : Patient has chronic painful trigger finger right long finger and new onset of trigger finger right ring finger.  The patient has similar findings on the left with chronic trigger finger left long finger locked in extension interfering with function and relatively new onset of left ring trigger finger. \par Because the chronicity and the refractory nature to cortisone injection surgery is indicated.  I reviewed risks, complications limitations and expectations.  Patient agrees and accepts this plan.  Patient is not prepared to perform the 2 surgeries 2 weeks apart, for example, which would enable her to have both surgeries with 1 set of PSTs.  Consequently surgery will be done first on the right, release of right long and right ring fingers.  After the patient recovers on the right hand then, if the patient is willing and interested she can undergo trigger release of the left long and ring fingers.\par Lengthy and detailed conference held regarding surgery, risks, complications, limitations, expectations.  Perioperative expectations, activities, need for therapy and many other factors were reviewed and discussed.\par All questions have been answered.\par Patient wishes to proceed with surgery and has expressed acceptance, understanding, and consent.\par \par Surgery for right long and right ring trigger finger is indicated because of symptoms refractory to conservative treatment. Surgical plan is A1 pulley release, exam under anesthesia with active finger flexion/extension, and excision of one slip of FDS tendon if there is continued triggering after A1 pulley release.\par The patient has pain and interference with activities of daily living. While the patient may see improvement, the patient may not have complete range of motion or complete return to activities of daily living. Postoperative hand therapy, and other treatment modalities may be required. The range of treatment alternatives, and the associated risks complications limitations and expectations were explained and discussed. Infection, incomplete range of motion, stiffness, pain, palmar fibromatosis are a just few of many factors reviewed and discussed with the patient.\par Release of A1 pulley, examination under anesthesia, possible partial tendon excision are indicated and are planned. \par All questions have been answered. The patient has expressed acceptance and understanding of the above and has consented to surgery.

## 2021-11-18 NOTE — HISTORY OF PRESENT ILLNESS
[FreeTextEntry1] : The patient is a 51-year-old right-hand-dominant woman.  Patient has a long history of difficulty flexing right and left long fingers.  Patient was seen March 16, 2021.  The difficulty flexing the fingers was determined to be trigger fingers locked in extension and the patient's unwillingness to flex the fingers because of anticipated pain. \par In March it was determined that the right and left long fingers were positioned in extension apparently for many years on the right as long as 10-15 and approximately 5 years for the left.  Patient was treated with local anesthetic and was able to flex fingers.  There was locking of fingers.  Patient was treated with cortisone injections into flexor sheaths of both right and left long fingers after local block as described above.  The patient was able to flex extend fingers at that time.  However, today, the patient reports that the triggering did not subside.\par Patient continues to avoid flexing long fingers fully.  Long fingers do lock occasionally and are very painful to extend.\par Patient returns today seeking treatment because of pain in right and left long fingers primarily and ring fingers secondarily.

## 2022-03-04 ENCOUNTER — OUTPATIENT (OUTPATIENT)
Dept: OUTPATIENT SERVICES | Facility: HOSPITAL | Age: 52
LOS: 1 days | End: 2022-03-04
Payer: COMMERCIAL

## 2022-03-04 VITALS
HEART RATE: 80 BPM | WEIGHT: 151.9 LBS | OXYGEN SATURATION: 97 % | RESPIRATION RATE: 15 BRPM | DIASTOLIC BLOOD PRESSURE: 88 MMHG | TEMPERATURE: 97 F | SYSTOLIC BLOOD PRESSURE: 125 MMHG | HEIGHT: 64 IN

## 2022-03-04 DIAGNOSIS — M65.30 TRIGGER FINGER, UNSPECIFIED FINGER: ICD-10-CM

## 2022-03-04 DIAGNOSIS — M65.341 TRIGGER FINGER, RIGHT RING FINGER: ICD-10-CM

## 2022-03-04 DIAGNOSIS — M65.332 TRIGGER FINGER, LEFT MIDDLE FINGER: ICD-10-CM

## 2022-03-04 DIAGNOSIS — Z98.890 OTHER SPECIFIED POSTPROCEDURAL STATES: Chronic | ICD-10-CM

## 2022-03-04 DIAGNOSIS — M65.331 TRIGGER FINGER, RIGHT MIDDLE FINGER: ICD-10-CM

## 2022-03-04 DIAGNOSIS — M65.342 TRIGGER FINGER, LEFT RING FINGER: ICD-10-CM

## 2022-03-04 DIAGNOSIS — Z01.818 ENCOUNTER FOR OTHER PREPROCEDURAL EXAMINATION: ICD-10-CM

## 2022-03-04 LAB
ANION GAP SERPL CALC-SCNC: 13 MMOL/L — SIGNIFICANT CHANGE UP (ref 5–17)
BUN SERPL-MCNC: 16 MG/DL — SIGNIFICANT CHANGE UP (ref 7–23)
CALCIUM SERPL-MCNC: 10 MG/DL — SIGNIFICANT CHANGE UP (ref 8.4–10.5)
CHLORIDE SERPL-SCNC: 99 MMOL/L — SIGNIFICANT CHANGE UP (ref 96–108)
CO2 SERPL-SCNC: 27 MMOL/L — SIGNIFICANT CHANGE UP (ref 22–31)
CREAT SERPL-MCNC: 0.68 MG/DL — SIGNIFICANT CHANGE UP (ref 0.5–1.3)
EGFR: 103 ML/MIN/1.73M2 — SIGNIFICANT CHANGE UP
GLUCOSE SERPL-MCNC: 113 MG/DL — HIGH (ref 70–99)
HCT VFR BLD CALC: 40.8 % — SIGNIFICANT CHANGE UP (ref 34.5–45)
HGB BLD-MCNC: 13.5 G/DL — SIGNIFICANT CHANGE UP (ref 11.5–15.5)
MCHC RBC-ENTMCNC: 31.1 PG — SIGNIFICANT CHANGE UP (ref 27–34)
MCHC RBC-ENTMCNC: 33.1 GM/DL — SIGNIFICANT CHANGE UP (ref 32–36)
MCV RBC AUTO: 94 FL — SIGNIFICANT CHANGE UP (ref 80–100)
NRBC # BLD: 0 /100 WBCS — SIGNIFICANT CHANGE UP (ref 0–0)
PLATELET # BLD AUTO: 421 K/UL — HIGH (ref 150–400)
POTASSIUM SERPL-MCNC: 4.2 MMOL/L — SIGNIFICANT CHANGE UP (ref 3.5–5.3)
POTASSIUM SERPL-SCNC: 4.2 MMOL/L — SIGNIFICANT CHANGE UP (ref 3.5–5.3)
RBC # BLD: 4.34 M/UL — SIGNIFICANT CHANGE UP (ref 3.8–5.2)
RBC # FLD: 14 % — SIGNIFICANT CHANGE UP (ref 10.3–14.5)
SODIUM SERPL-SCNC: 139 MMOL/L — SIGNIFICANT CHANGE UP (ref 135–145)
WBC # BLD: 5.04 K/UL — SIGNIFICANT CHANGE UP (ref 3.8–10.5)
WBC # FLD AUTO: 5.04 K/UL — SIGNIFICANT CHANGE UP (ref 3.8–10.5)

## 2022-03-04 PROCEDURE — 85027 COMPLETE CBC AUTOMATED: CPT

## 2022-03-04 PROCEDURE — 80048 BASIC METABOLIC PNL TOTAL CA: CPT

## 2022-03-04 PROCEDURE — G0463: CPT

## 2022-03-04 PROCEDURE — 36415 COLL VENOUS BLD VENIPUNCTURE: CPT

## 2022-03-04 RX ORDER — SODIUM CHLORIDE 9 MG/ML
3 INJECTION INTRAMUSCULAR; INTRAVENOUS; SUBCUTANEOUS EVERY 8 HOURS
Refills: 0 | Status: DISCONTINUED | OUTPATIENT
Start: 2022-03-18 | End: 2022-04-01

## 2022-03-04 RX ORDER — SODIUM CHLORIDE 9 MG/ML
1000 INJECTION, SOLUTION INTRAVENOUS
Refills: 0 | Status: DISCONTINUED | OUTPATIENT
Start: 2022-03-18 | End: 2022-04-01

## 2022-03-04 NOTE — H&P PST ADULT - PROBLEM SELECTOR PLAN 1
Release trigger finger right long and ring finger  Possible partial tendon excision  Labs- CBC, BMP  Pre op evaluation as per surgeon

## 2022-03-04 NOTE — H&P PST ADULT - NSICDXFAMILYHX_GEN_ALL_CORE_FT
FAMILY HISTORY:  Mother  Still living? No  FH: lupus erythematosus, Age at diagnosis: Age Unknown

## 2022-03-04 NOTE — H&P PST ADULT - NSANTHOSAYNRD_GEN_A_CORE
No. TANMAY screening performed.  STOP BANG Legend: 0-2 = LOW Risk; 3-4 = INTERMEDIATE Risk; 5-8 = HIGH Risk

## 2022-03-04 NOTE — H&P PST ADULT - HISTORY OF PRESENT ILLNESS
51 yo F with h/o poly arthritis c/o  right  middle & ring trigger finger x 5+ years.-had surgical consult- scheduled for release trigger finger long and ring finger, possible partial tendon excision on 3/18/22    **Pt denies any fever, chills or sick contacts  **Covid 19 PC on 3/15/22

## 2022-03-04 NOTE — H&P PST ADULT - FALL HARM RISK - UNIVERSAL INTERVENTIONS
Bed in lowest position, wheels locked, appropriate side rails in place/Call bell, personal items and telephone in reach/Instruct patient to call for assistance before getting out of bed or chair/Non-slip footwear when patient is out of bed/Rescue to call system/Physically safe environment - no spills, clutter or unnecessary equipment/Purposeful Proactive Rounding/Room/bathroom lighting operational, light cord in reach

## 2022-03-04 NOTE — H&P PST ADULT - NSICDXPASTMEDICALHX_GEN_ALL_CORE_FT
PAST MEDICAL HISTORY:  Bladder cancer Bladder cyst 1998    H/O thrombocytosis chronic    H/O vitamin D deficiency     Inflammatory polyarthritis     Stress fracture Right foot 3rd toe

## 2022-03-04 NOTE — H&P PST ADULT - ATTENDING COMMENTS
Patient has history of a connective tissue disease with chronic painful trigger finger right long finger and less severely painful trigger finger right ring finger. The patient has similar findings on the left with chronic trigger finger left long finger locked in extension interfering with function and relatively more recent onset of left ring trigger finger.      Because of the chronicity and because trigger fingers have been refractory to cortisone injection, surgery is indicated. I reviewed risks, complications, limitations, and expectations. Patient agrees and accepts this plan. Patient does not want to have left hand surgery 2 weeks later, which would enable her to have both surgeries with 1 set of PSTs. Consequently, surgery will be done first on the right, release of right long and right ring fingers. After the patient recovers on the right hand then, if the patient is willing and interested, she can undergo trigger release of the left long and ring fingers.     Lengthy and detailed conference held regarding surgery, risks, complications, limitations, expectations. Perioperative expectations, activities, need for therapy and many other factors were reviewed and discussed. All questions have been answered. Patient wishes to proceed with surgery and has expressed acceptance, understanding, and consent.     Surgery for right long and right ring trigger finger is indicated because of symptoms refractory to conservative treatment. Surgical plan is A1 pulley release, exam under anesthesia with active finger flexion/extension, and excision of one slip of FDS tendon if there is continued triggering after A1 pulley release. The patient has pain and interference with activities of daily living. While the patient may see improvement, the patient may not have complete range of motion or complete return to activities of daily living. Postoperative hand therapy, and other treatment modalities may be required. The range of treatment alternatives, and the associated risks complications limitations and expectations were explained and discussed. Infection, incomplete range of motion, stiffness, pain, palmar fibromatosis are a just few of many factors reviewed and discussed with the patient. All questions have been answered. The patient has expressed acceptance and understanding of the above and has consented to surgery.

## 2022-03-07 ENCOUNTER — APPOINTMENT (OUTPATIENT)
Dept: INTERNAL MEDICINE | Facility: CLINIC | Age: 52
End: 2022-03-07
Payer: COMMERCIAL

## 2022-03-07 ENCOUNTER — NON-APPOINTMENT (OUTPATIENT)
Age: 52
End: 2022-03-07

## 2022-03-07 VITALS
SYSTOLIC BLOOD PRESSURE: 102 MMHG | HEIGHT: 64 IN | BODY MASS INDEX: 26.46 KG/M2 | TEMPERATURE: 97.7 F | OXYGEN SATURATION: 98 % | HEART RATE: 77 BPM | DIASTOLIC BLOOD PRESSURE: 70 MMHG | WEIGHT: 155 LBS | RESPIRATION RATE: 16 BRPM

## 2022-03-07 DIAGNOSIS — Z01.818 ENCOUNTER FOR OTHER PREPROCEDURAL EXAMINATION: ICD-10-CM

## 2022-03-07 PROCEDURE — 93000 ELECTROCARDIOGRAM COMPLETE: CPT | Mod: 59

## 2022-03-07 PROCEDURE — 99214 OFFICE O/P EST MOD 30 MIN: CPT | Mod: 25

## 2022-03-07 RX ORDER — VALACYCLOVIR 1 G/1
1 TABLET, FILM COATED ORAL
Qty: 14 | Refills: 0 | Status: DISCONTINUED | COMMUNITY
Start: 2020-07-08 | End: 2022-03-07

## 2022-03-07 RX ORDER — MUPIROCIN 20 MG/G
2 OINTMENT TOPICAL
Qty: 22 | Refills: 0 | Status: DISCONTINUED | COMMUNITY
Start: 2022-01-11

## 2022-03-07 NOTE — ASSESSMENT
[Patient Optimized for Surgery] : Patient optimized for surgery [No Further Testing Recommended] : no further testing recommended [Modify medications prior to procedure] : Modify medications prior to procedure [FreeTextEntry4] : The patient is a 52 year-old female who is a low risk surgical patient with good functional capacity going for an intermediate risk surgical procedure.  [FreeTextEntry7] : Hold all vitamins/supplements 1 week prior to surgery.

## 2022-03-07 NOTE — PHYSICAL EXAM
[No Acute Distress] : no acute distress [Well Nourished] : well nourished [Well Developed] : well developed [Well-Appearing] : well-appearing [Normal Sclera/Conjunctiva] : normal sclera/conjunctiva [PERRL] : pupils equal round and reactive to light [EOMI] : extraocular movements intact [Normal Outer Ear/Nose] : the outer ears and nose were normal in appearance [Normal Oropharynx] : the oropharynx was normal [No JVD] : no jugular venous distention [No Lymphadenopathy] : no lymphadenopathy [Supple] : supple [Thyroid Normal, No Nodules] : the thyroid was normal and there were no nodules present [No Respiratory Distress] : no respiratory distress  [No Accessory Muscle Use] : no accessory muscle use [Clear to Auscultation] : lungs were clear to auscultation bilaterally [Normal Rate] : normal rate  [Regular Rhythm] : with a regular rhythm [Normal S1, S2] : normal S1 and S2 [No Murmur] : no murmur heard [No Carotid Bruits] : no carotid bruits [No Abdominal Bruit] : a ~M bruit was not heard ~T in the abdomen [No Varicosities] : no varicosities [Pedal Pulses Present] : the pedal pulses are present [No Edema] : there was no peripheral edema [No Palpable Aorta] : no palpable aorta [No Extremity Clubbing/Cyanosis] : no extremity clubbing/cyanosis [Soft] : abdomen soft [Non Tender] : non-tender [Non-distended] : non-distended [No Masses] : no abdominal mass palpated [No HSM] : no HSM [Normal Bowel Sounds] : normal bowel sounds [Normal Posterior Cervical Nodes] : no posterior cervical lymphadenopathy [Normal Anterior Cervical Nodes] : no anterior cervical lymphadenopathy [No CVA Tenderness] : no CVA  tenderness [No Spinal Tenderness] : no spinal tenderness [No Joint Swelling] : no joint swelling [Grossly Normal Strength/Tone] : grossly normal strength/tone [No Rash] : no rash [Coordination Grossly Intact] : coordination grossly intact [No Focal Deficits] : no focal deficits [Normal Gait] : normal gait [Deep Tendon Reflexes (DTR)] : deep tendon reflexes were 2+ and symmetric [Normal Affect] : the affect was normal [Normal Insight/Judgement] : insight and judgment were intact [Speech Grossly Normal] : speech grossly normal [Memory Grossly Normal] : memory grossly normal [Alert and Oriented x3] : oriented to person, place, and time [Normal Mood] : the mood was normal [de-identified] : overweight

## 2022-03-07 NOTE — ADDENDUM
[FreeTextEntry1] : I, Alvaro Byrd, acted solely as scribe for Dr. Parminder Sales DO on this date 03/07/2022  2:00PM .\par \par All medical record entries made by the Scribe were at my, Dr. Parminder Sales DO direction and personally dictated by me on 03/07/2022  2:00PM. I have reviewed the chart and agree that the record accurately reflects my personal performance of the history, physical exam, assessment and plan. I have also personally directed, reviewed and agreed with the chart.\par

## 2022-03-07 NOTE — REVIEW OF SYSTEMS
[Joint Pain] : joint pain [Joint Stiffness] : joint stiffness [Negative] : Heme/Lymph [FreeTextEntry9] : trigger finger (3rd digit and 4th digit of both hands)

## 2022-03-07 NOTE — PLAN
[FreeTextEntry1] : 1. Results of preoperative EKG and blood work are noted above. \par 2. The patient was instructed to stop eating prior to midnight the evening before the procedure.\par 3. The patient was instructed not to take any vitamins/supplements in the week prior to surgery.\par 4. There is no medical contraindication to the planned procedure and the patient is therefore medically optimized/cleared for the procedure. \par \par check EKG (results as above)

## 2022-03-07 NOTE — RESULTS/DATA
[] : results reviewed [de-identified] : BMP normal except slightly elevated blood-glucose (113; non-fasting).  [de-identified] : Normal except elevated PLT (421).  [de-identified] : EKG shows normal sinus rhythm at 67 BPM.

## 2022-03-07 NOTE — HISTORY OF PRESENT ILLNESS
[No Pertinent Cardiac History] : no history of aortic stenosis, atrial fibrillation, coronary artery disease, recent myocardial infarction, or implantable device/pacemaker [No Pertinent Pulmonary History] : no history of asthma, COPD, sleep apnea, or smoking [No Adverse Anesthesia Reaction] : no adverse anesthesia reaction in self or family member [(Patient denies any chest pain, claudication, dyspnea on exertion, orthopnea, palpitations or syncope)] : Patient denies any chest pain, claudication, dyspnea on exertion, orthopnea, palpitations or syncope [Good (7-10 METs)] : Good (7-10 METs) [Chronic Anticoagulation] : no chronic anticoagulation [Chronic Kidney Disease] : no chronic kidney disease [FreeTextEntry1] : trigger finger repair (3rd digit and 4th digit of both hands) with possible partial tendon excision [Diabetes] : no diabetes [FreeTextEntry2] : 3/18/22 [FreeTextEntry3] : Dr. Kt Rivas [FreeTextEntry4] : Patient is a 52 year-old female with a past medical history as below who presents for preoperative evaluation prior to trigger finger repair (3rd digit and 4th digit of both hands) with possible partial tendon excision. The procedure will be performed by Dr. Kt Rivas. Patient has no history of allergy or adverse reaction to anesthesia. She can walk many blocks and can walk up 1-2 flights of stairs without dyspnea on exertion. She denies chest pain or palpitations. Pre-surgical lab work was done on 3/4/22 (results noted below).

## 2022-03-15 ENCOUNTER — OUTPATIENT (OUTPATIENT)
Dept: OUTPATIENT SERVICES | Facility: HOSPITAL | Age: 52
LOS: 1 days | End: 2022-03-15
Payer: COMMERCIAL

## 2022-03-15 DIAGNOSIS — Z11.52 ENCOUNTER FOR SCREENING FOR COVID-19: ICD-10-CM

## 2022-03-15 DIAGNOSIS — Z98.890 OTHER SPECIFIED POSTPROCEDURAL STATES: Chronic | ICD-10-CM

## 2022-03-15 LAB — SARS-COV-2 RNA SPEC QL NAA+PROBE: SIGNIFICANT CHANGE UP

## 2022-03-15 PROCEDURE — U0005: CPT

## 2022-03-15 PROCEDURE — U0003: CPT

## 2022-03-15 PROCEDURE — C9803: CPT

## 2022-03-17 ENCOUNTER — TRANSCRIPTION ENCOUNTER (OUTPATIENT)
Age: 52
End: 2022-03-17

## 2022-03-18 ENCOUNTER — OUTPATIENT (OUTPATIENT)
Dept: OUTPATIENT SERVICES | Facility: HOSPITAL | Age: 52
LOS: 1 days | End: 2022-03-18
Payer: COMMERCIAL

## 2022-03-18 ENCOUNTER — APPOINTMENT (OUTPATIENT)
Dept: ORTHOPEDIC SURGERY | Facility: HOSPITAL | Age: 52
End: 2022-03-18

## 2022-03-18 VITALS
TEMPERATURE: 98 F | HEART RATE: 73 BPM | HEIGHT: 64 IN | WEIGHT: 151.9 LBS | RESPIRATION RATE: 16 BRPM | DIASTOLIC BLOOD PRESSURE: 79 MMHG | SYSTOLIC BLOOD PRESSURE: 116 MMHG | OXYGEN SATURATION: 100 %

## 2022-03-18 VITALS
SYSTOLIC BLOOD PRESSURE: 126 MMHG | HEART RATE: 60 BPM | RESPIRATION RATE: 15 BRPM | OXYGEN SATURATION: 100 % | DIASTOLIC BLOOD PRESSURE: 70 MMHG

## 2022-03-18 DIAGNOSIS — M65.341 TRIGGER FINGER, RIGHT RING FINGER: ICD-10-CM

## 2022-03-18 DIAGNOSIS — M65.331 TRIGGER FINGER, RIGHT MIDDLE FINGER: ICD-10-CM

## 2022-03-18 DIAGNOSIS — M65.332 TRIGGER FINGER, LEFT MIDDLE FINGER: ICD-10-CM

## 2022-03-18 DIAGNOSIS — M65.342 TRIGGER FINGER, LEFT RING FINGER: ICD-10-CM

## 2022-03-18 DIAGNOSIS — Z98.890 OTHER SPECIFIED POSTPROCEDURAL STATES: Chronic | ICD-10-CM

## 2022-03-18 PROCEDURE — 26055 INCISE FINGER TENDON SHEATH: CPT | Mod: F7

## 2022-03-18 PROCEDURE — 26340 MANIPULATE FINGER W/ANESTH: CPT | Mod: 59,F7

## 2022-03-18 RX ORDER — IBUPROFEN 200 MG
600 TABLET ORAL ONCE
Refills: 0 | Status: DISCONTINUED | OUTPATIENT
Start: 2022-03-18 | End: 2022-04-01

## 2022-03-18 RX ORDER — LIDOCAINE HCL 20 MG/ML
0.2 VIAL (ML) INJECTION ONCE
Refills: 0 | Status: COMPLETED | OUTPATIENT
Start: 2022-03-18 | End: 2022-03-18

## 2022-03-18 RX ORDER — CHLORHEXIDINE GLUCONATE 213 G/1000ML
1 SOLUTION TOPICAL ONCE
Refills: 0 | Status: COMPLETED | OUTPATIENT
Start: 2022-03-18 | End: 2022-03-18

## 2022-03-18 RX ORDER — CHOLECALCIFEROL (VITAMIN D3) 125 MCG
1 CAPSULE ORAL
Qty: 0 | Refills: 0 | DISCHARGE

## 2022-03-18 RX ADMIN — SODIUM CHLORIDE 3 MILLILITER(S): 9 INJECTION INTRAMUSCULAR; INTRAVENOUS; SUBCUTANEOUS at 06:10

## 2022-03-18 RX ADMIN — CHLORHEXIDINE GLUCONATE 1 APPLICATION(S): 213 SOLUTION TOPICAL at 06:01

## 2022-03-18 RX ADMIN — SODIUM CHLORIDE 100 MILLILITER(S): 9 INJECTION, SOLUTION INTRAVENOUS at 06:01

## 2022-03-18 NOTE — BRIEF OPERATIVE NOTE - NSICDXBRIEFPOSTOP_GEN_ALL_CORE_FT
POST-OP DIAGNOSIS:  Trigger ring finger of right hand 18-Mar-2022 08:05:22  Alexis Kent  Trigger middle finger of right hand 18-Mar-2022 08:05:41  Alexis Kent

## 2022-03-18 NOTE — ASU DISCHARGE PLAN (ADULT/PEDIATRIC) - NURSING INSTRUCTIONS
Next dose of Tylenol will be on or after 1:15pm ,today/tonight, If needed for pain/cramps. Your first dose of Tylenol was given at 07:15_. Do not exceed more than 4000mg of Tylenol in one 24 hour setting.    Follow Up With Dr. Rob Vragas 3/29/22 @ [id number] in the [place] office

## 2022-03-18 NOTE — ASU PATIENT PROFILE, ADULT - FALL HARM RISK - UNIVERSAL INTERVENTIONS
Bed in lowest position, wheels locked, appropriate side rails in place/Call bell, personal items and telephone in reach/Instruct patient to call for assistance before getting out of bed or chair/Non-slip footwear when patient is out of bed/[place] to call system/Physically safe environment - no spills, clutter or unnecessary equipment/Purposeful Proactive Rounding/Room/bathroom lighting operational, light cord in reach

## 2022-03-18 NOTE — ASU DISCHARGE PLAN (ADULT/PEDIATRIC) - NS MD DC FALL RISK RISK
For information on Fall & Injury Prevention, visit: [email or web address] OR  [email or web address] OR  https://www.cdc.gov/steadi/patient.html

## 2022-03-18 NOTE — ASU DISCHARGE PLAN (ADULT/PEDIATRIC) - CARE PROVIDER_API CALL
Kt Rivas (MD)  Orthopaedic Surgery; Surgery of the Hand  [address], [address]  [place] [address]  Phone: [phone]  Fax: [phone]  Follow Up Time: 1 week

## 2022-03-18 NOTE — BRIEF OPERATIVE NOTE - NSICDXBRIEFPREOP_GEN_ALL_CORE_FT
PRE-OP DIAGNOSIS:  Trigger ring finger of right hand 18-Mar-2022 08:05:09  Alexis Kent  Trigger middle finger of right hand 18-Mar-2022 08:05:33  Alexis Kent

## 2022-03-18 NOTE — BRIEF OPERATIVE NOTE - NSICDXBRIEFPROCEDURE_GEN_ALL_CORE_FT
PROCEDURES:  Release of trigger finger of right ring finger 18-Mar-2022 08:04:31  Alexis Kent  Release of trigger finger of right middle finger 18-Mar-2022 08:04:47  Alexis Kent

## 2022-03-21 ENCOUNTER — NON-APPOINTMENT (OUTPATIENT)
Age: 52
End: 2022-03-21

## 2022-03-23 ENCOUNTER — NON-APPOINTMENT (OUTPATIENT)
Age: 52
End: 2022-03-23

## 2022-03-24 ENCOUNTER — NON-APPOINTMENT (OUTPATIENT)
Age: 52
End: 2022-03-24

## 2022-03-28 PROBLEM — C67.9 MALIGNANT NEOPLASM OF BLADDER, UNSPECIFIED: Chronic | Status: ACTIVE | Noted: 2022-03-04

## 2022-03-28 PROBLEM — Z86.39 PERSONAL HISTORY OF OTHER ENDOCRINE, NUTRITIONAL AND METABOLIC DISEASE: Chronic | Status: ACTIVE | Noted: 2022-03-04

## 2022-03-28 PROBLEM — M06.4 INFLAMMATORY POLYARTHROPATHY: Chronic | Status: ACTIVE | Noted: 2022-03-04

## 2022-03-28 PROBLEM — M84.30XA STRESS FRACTURE, UNSPECIFIED SITE, INITIAL ENCOUNTER FOR FRACTURE: Chronic | Status: ACTIVE | Noted: 2022-03-04

## 2022-03-28 PROBLEM — Z86.2 PERSONAL HISTORY OF DISEASES OF THE BLOOD AND BLOOD-FORMING ORGANS AND CERTAIN DISORDERS INVOLVING THE IMMUNE MECHANISM: Chronic | Status: ACTIVE | Noted: 2022-03-04

## 2022-03-29 ENCOUNTER — APPOINTMENT (OUTPATIENT)
Dept: ORTHOPEDIC SURGERY | Facility: CLINIC | Age: 52
End: 2022-03-29
Payer: COMMERCIAL

## 2022-03-29 PROCEDURE — 99024 POSTOP FOLLOW-UP VISIT: CPT

## 2022-03-29 NOTE — DISCUSSION/SUMMARY
[FreeTextEntry1] : The patient initially had difficulty actively flexing her fingers and was having pain.  Patient still has pain but pain is decreasing.  Patient has gone to hand therapy and has made excellent strides.  Today, we worked on active, active assisted, and passive range of motion for the right hand.  Patient was able to passively flex all fingers and then try to actively hold them in a flexed position.  During the course of office visit there was improvement.  There is still need for more improvement.\par Patient will continue with supervised hand therapy as well as a home program.\par Reevaluate 3 to 4 weeks.Call sooner or return sooner if questions or concerns arise.\par A lengthy and detailed discussion was held with the patient regarding analysis, treatment, and recommendations. All questions have been answered. At the conclusion the patient expressed acceptance, understanding and agreement with the plan.

## 2022-03-29 NOTE — PHYSICAL EXAM
[de-identified] : Right hand\par Long and ring finger trigger incisions wound is healed, clean and dry. No redness or sign of infection. Sutures removed.\par Initial active range of motion lacked 3 to 4 cm pulp to palm for long and ring fingers\par With active, active assisted, and passive range of motion patient was able to maintain long and ring fingers after passively flex fully within 1 cm ring and 1.5 cm long finger pulp to palm distance\par Repetition the patient improved active flexion.\par Index finger provocable triggering\par Normal sensory\par \par Left hand\par Active flexion index and little full flexion ring finger virtually full flexion long finger to within 1.5 cm pulp to palm\par Long finger is triggering\par

## 2022-03-29 NOTE — HISTORY OF PRESENT ILLNESS
[FreeTextEntry1] : Patient underwent release of right long and right ring trigger fingers 3/18/2022.  Patient had considerable stiffness of long finger preoperatively.  Patient had had trigger finger for at least 8 to 10 years.  Intraoperatively the right long finger was manipulated and ultimately was passively flexed nearly pulp to palm.  After manipulation and after trigger release patient was able to actively flex long finger to within 5 mm pulp to palm distance.\par I had telephone calls postoperatively the patient, when she reported inability to flex fingers closer than "FCI to the palm".  The patient was then advised to begin hand therapy as soon as possible.  Patient was able to begin hand therapy last week.\par \par TODAY:\par Patient has begun hand therapy and is gradually improving.\par The patient has pain with flexion of long finger primarily and ring finger to a lesser degree as expected.\par

## 2022-05-01 NOTE — HISTORY OF PRESENT ILLNESS
[FreeTextEntry1] : Patient underwent release of trigger finger right long finger and right ring finger 3/18/2022.\par There was severe stiffness of right long finger and this required manipulation intraoperatively.\par Patient was seen 3/29/2022 for 1st postop visit at which time wounds were healed and there was still considerable stiffness with pulp to palm gap of 3 to 4 cm actively.  Passively 1 to 2 cm.\par The critical importance of patient pursuing aggressive hand therapy program under supervision as well as a home program was explained and discussed with the patient excepted.\par The patient has seen a rheumatologist, Dr. Watson, and has currently being evaluated for possible connective tissue disease.\par Patient now returns for her 2nd postop visit.\par

## 2022-05-02 ENCOUNTER — APPOINTMENT (OUTPATIENT)
Dept: ORTHOPEDIC SURGERY | Facility: CLINIC | Age: 52
End: 2022-05-02
Payer: COMMERCIAL

## 2022-05-03 ENCOUNTER — LABORATORY RESULT (OUTPATIENT)
Age: 52
End: 2022-05-03

## 2022-05-03 ENCOUNTER — APPOINTMENT (OUTPATIENT)
Dept: RHEUMATOLOGY | Facility: CLINIC | Age: 52
End: 2022-05-03
Payer: COMMERCIAL

## 2022-05-03 VITALS
OXYGEN SATURATION: 99 % | HEART RATE: 63 BPM | BODY MASS INDEX: 25.61 KG/M2 | WEIGHT: 150 LBS | SYSTOLIC BLOOD PRESSURE: 136 MMHG | DIASTOLIC BLOOD PRESSURE: 82 MMHG | HEIGHT: 64 IN

## 2022-05-03 DIAGNOSIS — Z78.0 ASYMPTOMATIC MENOPAUSAL STATE: ICD-10-CM

## 2022-05-03 DIAGNOSIS — M84.379A: ICD-10-CM

## 2022-05-03 PROCEDURE — 99215 OFFICE O/P EST HI 40 MIN: CPT | Mod: GC

## 2022-05-04 LAB
25(OH)D3 SERPL-MCNC: 58.1 NG/ML
ALBUMIN SERPL ELPH-MCNC: 4.8 G/DL
ALP BLD-CCNC: 68 U/L
ALT SERPL-CCNC: 21 U/L
ANION GAP SERPL CALC-SCNC: 12 MMOL/L
AST SERPL-CCNC: 18 U/L
BASOPHILS # BLD AUTO: 0.06 K/UL
BASOPHILS NFR BLD AUTO: 1.4 %
BILIRUB SERPL-MCNC: 0.5 MG/DL
BUN SERPL-MCNC: 17 MG/DL
C3 SERPL-MCNC: 110 MG/DL
C4 SERPL-MCNC: 28 MG/DL
CALCIUM SERPL-MCNC: 10.1 MG/DL
CALCIUM SERPL-MCNC: 9.8 MG/DL
CELIACPAN: NORMAL
CHLORIDE SERPL-SCNC: 103 MMOL/L
CHROMATIN AB SERPL-ACNC: <0.2 AL
CK SERPL-CCNC: 123 U/L
CO2 SERPL-SCNC: 26 MMOL/L
CREAT SERPL-MCNC: 0.61 MG/DL
CRP SERPL-MCNC: <3 MG/L
EGFR: 106 ML/MIN/1.73M2
ENA RNP AB SER IA-ACNC: 1.7 AL
ENA SM AB SER IA-ACNC: <0.2 AL
ENA SS-A AB SER IA-ACNC: <0.2 AL
ENA SS-B AB SER IA-ACNC: <0.2 AL
EOSINOPHIL # BLD AUTO: 0.17 K/UL
EOSINOPHIL NFR BLD AUTO: 4 %
ERYTHROCYTE [SEDIMENTATION RATE] IN BLOOD BY WESTERGREN METHOD: 26 MM/HR
HCT VFR BLD CALC: 43 %
HGB BLD-MCNC: 14 G/DL
IMM GRANULOCYTES NFR BLD AUTO: 0 %
LYMPHOCYTES # BLD AUTO: 1.5 K/UL
LYMPHOCYTES NFR BLD AUTO: 35.3 %
MAN DIFF?: NORMAL
MCHC RBC-ENTMCNC: 30.8 PG
MCHC RBC-ENTMCNC: 32.6 GM/DL
MCV RBC AUTO: 94.5 FL
MONOCYTES # BLD AUTO: 0.41 K/UL
MONOCYTES NFR BLD AUTO: 9.6 %
NEUTROPHILS # BLD AUTO: 2.11 K/UL
NEUTROPHILS NFR BLD AUTO: 49.7 %
PARATHYROID HORMONE INTACT: 30 PG/ML
PLATELET # BLD AUTO: 411 K/UL
POTASSIUM SERPL-SCNC: 4.7 MMOL/L
PROT SERPL-MCNC: 7.5 G/DL
RBC # BLD: 4.55 M/UL
RBC # FLD: 14.5 %
RHEUMATOID FACT SER QL: <10 IU/ML
SODIUM SERPL-SCNC: 142 MMOL/L
TSH SERPL-ACNC: 3.33 UIU/ML
WBC # FLD AUTO: 4.25 K/UL

## 2022-05-04 NOTE — HISTORY OF PRESENT ILLNESS
[Arthralgias] : arthralgias [FreeTextEntry1] : INTERVAL HX \par Last seen in 2021 - multiple ortho evaluations over the year reviewed \par - She reports worsening pain and stiffness in her fingers. Specifically middle and ring fingers of both hands. She underwent trigger finger release surgery this past March for her right middle and ring finger. Her joint pain is in PIP and DIP distribution. No MCP or wrist pain. Morning stiffness only lasts a few minutes.  Continues to have low back pain and foot pain. Her foot pain is not a joint specific pain but rather her whole foot, sparing the ankles. She is currently doing OT for her fingers and takes Aleve for breakthrough pain only. She attributes the worsening in pain after reaching menopause last year.  [Anorexia] : no anorexia [Weight Loss] : no weight loss [Malaise] : no malaise [Fever] : no fever [Fatigue] : no fatigue [Depression] : no depression [Malar Facial Rash] : no malar facial rash [Skin Lesions] : no lesions [Oral Ulcers] : no oral ulcers [Cough] : no cough [Dry Mouth] : no dry mouth [Shortness of Breath] : no shortness of breath [Chest Pain] : no chest pain [Joint Warmth] : no joint warmth [Difficulty Standing] : no difficulty standing [Muscle Spasms] : no muscle spasms [Eye Pain] : no eye pain [Eye Redness] : no eye redness [Dry Eyes] : no dry eyes

## 2022-05-04 NOTE — PHYSICAL EXAM
[General Appearance - Alert] : alert [General Appearance - In No Acute Distress] : in no acute distress [General Appearance - Well Nourished] : well nourished [General Appearance - Well Developed] : well developed [General Appearance - Well-Appearing] : healthy appearing [Skin Color & Pigmentation] : normal skin color and pigmentation [Skin Turgor] : normal skin turgor [] : no rash [Oriented To Time, Place, And Person] : oriented to person, place, and time [Impaired Insight] : insight and judgment were intact [Affect] : the affect was normal [Sclera] : the sclera and conjunctiva were normal [Outer Ear] : the ears and nose were normal in appearance [Neck Appearance] : the appearance of the neck was normal [Auscultation Breath Sounds / Voice Sounds] : lungs were clear to auscultation bilaterally [Heart Rate And Rhythm] : heart rate was normal and rhythm regular [Heart Sounds] : normal S1 and S2 [Edema] : there was no peripheral edema [Abdomen Soft] : soft [Abdomen Tenderness] : non-tender [No Spinal Tenderness] : no spinal tenderness [Abnormal Walk] : normal gait [Musculoskeletal - Swelling] : no joint swelling seen [Motor Tone] : muscle strength and tone were normal [FreeTextEntry1] : good ROM in fingers; right MCP 3-4 palmar swelling and decreased flexion

## 2022-05-04 NOTE — ASSESSMENT
[FreeTextEntry1] : 51 yo family h/o autoimmunity (SLE< RA< PsA) here with polyarthralgias\par \par multiple areas addressed today \par reviewed Dr. Del Angel's notes\par operative note reviewed\par \par #chronic trigger finger\par chronic ofr > 10 years.  has had steroids, NSAID, exercises and injections over the years without improvement.  has stiffness in the AM but only a few minutes - not prolonged. previous w/u for aiCTD was negative with normal inflammatory markers. MRI without synovitis.  presents a year later after surgery as still cannot fully flex the finger.  no new s/s of aiCTD have emerged\par -- s/p trigger release surgery of right middle and ring finger (March 2022)- will d/w dr del angel\par -- c/w OT\par -- start Naproxen 500 mg bid with meals to help with pain and inflammation\par -- f/u with Ortho\par \par #Low positive RNP and CCP, family Hx of autoimmune dx, one episode of Raynauds?\par initial serologic workup negative for scl or lupus.  RNP and CCP low positive though and without pulmonary or muscle complaints/weakness- CPK/myomarker negative. PFTs normal. CT Chest negative for ILD.\par -- continue hand warming for now - no meds\par -- recheck SADIA, C3, C4, DSDNA, STEFFEN, CK, RF, Sjogrens, ESR, CRP\par -- check CBC, CMP\par \par # Bone Health (menopause 2021)\par -- obtain Dexa Bone Density (ordered)\par -- check SPEP, TSH, Vitamin D, PTH, Celiac disease antibodies, C-telopeptide, N-telopeptide\par -- referral to Endocrine\par \par d/w Dr. Uriarte\par \par More than 50% of the encounter was spent counseling the patient on differential, workup, disease course and treatment/management.  Education was provided to the patient during this encounter.  All questions and concerns were addressed and answered.   The patient verbalized understanding and agreed to the plan. \par \par Patient has been instructed to call for an appointment if new symptoms develop.\par Patient has been instructed to make a followup appointment in 1 months.\par \par Time spent on the encounter included, but is not limited to, preparing to see the patient, obtaining and/or reviewing separately obtained history, performing the evaluation, counseling and educating, independently interpreting results with communication to patient, order placement, referring and/or communicating with other health professionals as described, and documenting clinical information in the electronic health record\par

## 2022-05-05 LAB — ANA SER IF-ACNC: NEGATIVE

## 2022-05-06 LAB
ALBUMIN MFR SERPL ELPH: 61.4 %
ALBUMIN SERPL-MCNC: 4.5 G/DL
ALBUMIN/GLOB SERPL: 1.6 RATIO
ALPHA1 GLOB MFR SERPL ELPH: 3.3 %
ALPHA1 GLOB SERPL ELPH-MCNC: 0.2 G/DL
ALPHA2 GLOB MFR SERPL ELPH: 9.3 %
ALPHA2 GLOB SERPL ELPH-MCNC: 0.7 G/DL
B-GLOBULIN MFR SERPL ELPH: 12.6 %
B-GLOBULIN SERPL ELPH-MCNC: 0.9 G/DL
COLLAGEN NTX UR-SCNC: 93 NMOL BCE
COLLAGEN NTX/CREAT UR-SRTO: 45
CREAT UR-MCNC: 23.4 MG/DL
DSDNA AB SER-ACNC: 12 IU/ML
GAMMA GLOB FLD ELPH-MCNC: 1 G/DL
GAMMA GLOB MFR SERPL ELPH: 13.4 %
INTERPRETATION SERPL IEP-IMP: NORMAL
INTERPRETIVE GUIDE:: NORMAL
PROT SERPL-MCNC: 7.4 G/DL
PROT SERPL-MCNC: 7.4 G/DL

## 2022-05-09 LAB — COLLAGEN CTX SERPL-MCNC: 398 PG/ML

## 2022-05-10 ENCOUNTER — APPOINTMENT (OUTPATIENT)
Dept: ORTHOPEDIC SURGERY | Facility: CLINIC | Age: 52
End: 2022-05-10
Payer: COMMERCIAL

## 2022-05-10 VITALS — WEIGHT: 150 LBS | HEIGHT: 64 IN | BODY MASS INDEX: 25.61 KG/M2

## 2022-05-10 DIAGNOSIS — M65.342 TRIGGER FINGER, LEFT RING FINGER: ICD-10-CM

## 2022-05-10 DIAGNOSIS — M65.341 TRIGGER FINGER, RIGHT RING FINGER: ICD-10-CM

## 2022-05-10 DIAGNOSIS — M65.321 TRIGGER FINGER, RIGHT INDEX FINGER: ICD-10-CM

## 2022-05-10 DIAGNOSIS — M65.332 TRIGGER FINGER, LEFT MIDDLE FINGER: ICD-10-CM

## 2022-05-10 DIAGNOSIS — M65.331 TRIGGER FINGER, RIGHT MIDDLE FINGER: ICD-10-CM

## 2022-05-10 PROCEDURE — 99024 POSTOP FOLLOW-UP VISIT: CPT

## 2022-05-11 ENCOUNTER — APPOINTMENT (OUTPATIENT)
Dept: RADIOLOGY | Facility: CLINIC | Age: 52
End: 2022-05-11
Payer: COMMERCIAL

## 2022-05-11 ENCOUNTER — OUTPATIENT (OUTPATIENT)
Dept: OUTPATIENT SERVICES | Facility: HOSPITAL | Age: 52
LOS: 1 days | End: 2022-05-11
Payer: COMMERCIAL

## 2022-05-11 DIAGNOSIS — Z00.8 ENCOUNTER FOR OTHER GENERAL EXAMINATION: ICD-10-CM

## 2022-05-11 DIAGNOSIS — Z98.890 OTHER SPECIFIED POSTPROCEDURAL STATES: Chronic | ICD-10-CM

## 2022-05-11 DIAGNOSIS — Z00.00 ENCOUNTER FOR GENERAL ADULT MEDICAL EXAMINATION WITHOUT ABNORMAL FINDINGS: ICD-10-CM

## 2022-05-11 PROCEDURE — 77080 DXA BONE DENSITY AXIAL: CPT | Mod: 26

## 2022-05-11 PROCEDURE — 77080 DXA BONE DENSITY AXIAL: CPT

## 2022-05-11 NOTE — PHYSICAL EXAM
[de-identified] : Right hand\par Long and ring finger trigger incisions well-healed healed, clean and dry. \par No redness or sign of infection.\par With concentration patient can actively flex index and little fingers fully.\par Long finger actively flexes 2 cm pulp to palm.\par Full passive long finger flexion composite flexion.\par Paradoxical extension of long finger when patient flexes the other 3 digits\par With mass composite flexion using maximum effort, the patient fully flexed index ring and little fully.  \par Long finger flexed approximately 2 cm pulp to palm with greater effort to flex index and ring there was paradoxical extension of the long finger a few degrees\par Index finger provocable triggering; not painful, non tender\par Normal sensory\par \par Left hand\par Active flexion index and little full flexion ring finger virtually full flexion long finger to within 1.5 cm pulp to palm\par Long finger triggering following composite mass finger flexion\par L3 Composite flexion to 15 mm pulp to palm\par Other fingers have full flexion extension without triggering\par \par Neurologic: Median, ulnar, and radial motor and sensory are intact. \par Skin: No cyanosis, clubbing, or rashes.\par Vascular: Radial pulses intact.\par Lymphatic: No streaking or epitrochlear adenopathy.\par The patient is awake, alert, and oriented. Affect appropriate. Cooperative.

## 2022-05-11 NOTE — HISTORY OF PRESENT ILLNESS
[FreeTextEntry1] : Patient underwent release of right long and right ring trigger fingers 3/18/2022.  Patient had several years of finger stiffness prior to surgery.  Following surgery the patient also had considerable stiffness even though the fingers were manipulated intraoperatively.  For that reason the patient was referred to hand therapy and an aggressive incontinence hand therapy program was prescribed and necessary.\par Patient also was referred to Dr. Watson for rheumatologic consultation.\par Patient was noted to have low positive RNP and CCP antibodies, Raynaud's, family history of autoimmune disorder. Pt noted to have initial w/u negative for scleroderma, lupus.\par Patient was scheduled for follow-up 5/2/2022 but did not make that appointment.\par Patient just recently restarted hand therapy.\par \par TODAY:\par Pt went to hand therapy for 4 wks. Went to [place], "doing home exercises, though not religiously", possibly once per day but not more frequently..\par Pt went to [place] with husbands family for 10 days.\par After patient returned to the [place] area, she restarted hand therapy May 6th.\par Patient has been making steady gains although she states that she is disappointed and feels that she should be doing better.

## 2022-05-11 NOTE — DISCUSSION/SUMMARY
[FreeTextEntry1] : The patient stopped supervised hand therapy when she was in [place].  Patient did some exercising on her own.  \par Since returning from [place] the patient is returned to hand therapy.  \par In total the patient has been making steady gains in terms of improved active flexion right long and ring fingers.  Patient is able to make a full fist with composite full flexion of ring finger.  Long finger still does not actively fully flex and there is a paradoxical extension of the long finger when the patient concentrates on flexing index and ring.\par Patient has triggering of index finger right hand with mild discomfort.\par Patient has triggering of the left long finger which is more painful.\par On the left the patient makes a full fist with index ring and little'; and long finger lacks approximately 1.5 to 2 cm because of pain and associated guarding.\par At some point in the future trigger release for these remaining trigger fingers, left long and right index, would be indicated but for the time being the patient should continue to concentrate on regaining flexion of the right long finger and improving the function of the right hand.\par Patient is definitely showing improvement but needs to continue the effort.  I have made this clear to patient who agrees and accepts.\par Routine follow-up 1 month.\par A lengthy and detailed discussion was held with the patient regarding analysis, treatment, and recommendations. All questions have been answered. At the conclusion the patient expressed acceptance, understanding and agreement with the plan.

## 2022-05-26 ENCOUNTER — APPOINTMENT (OUTPATIENT)
Dept: RHEUMATOLOGY | Facility: CLINIC | Age: 52
End: 2022-05-26
Payer: COMMERCIAL

## 2022-05-26 DIAGNOSIS — M13.0 POLYARTHRITIS, UNSPECIFIED: ICD-10-CM

## 2022-05-26 DIAGNOSIS — M79.641 PAIN IN RIGHT HAND: ICD-10-CM

## 2022-05-26 DIAGNOSIS — M79.642 PAIN IN RIGHT HAND: ICD-10-CM

## 2022-05-26 DIAGNOSIS — R76.8 OTHER SPECIFIED ABNORMAL IMMUNOLOGICAL FINDINGS IN SERUM: ICD-10-CM

## 2022-05-26 PROCEDURE — 99442: CPT

## 2022-05-26 NOTE — HISTORY OF PRESENT ILLNESS
[Home] : at home, [unfilled] , at the time of the visit. [Other Location: e.g. Home (Enter Location, City,State)___] : at [unfilled] [Verbal consent obtained from patient] : the patient, [unfilled] [FreeTextEntry1] : INTERVAL HX \par - stopped the naprosyn because it was giving indigestion - only taking alieve when needed.  \par - intermittent days that are worse than others - but plenty of good days.\par - the alieve helps on the bad days\par - uses voltaren gel on the low back \par - going to [place] in june [Anorexia] : no anorexia [Weight Loss] : no weight loss [Malaise] : no malaise [Fever] : no fever [Fatigue] : no fatigue [Depression] : no depression [Malar Facial Rash] : no malar facial rash [Skin Lesions] : no lesions [Oral Ulcers] : no oral ulcers [Cough] : no cough [Dry Mouth] : no dry mouth [Shortness of Breath] : no shortness of breath [Chest Pain] : no chest pain [Arthralgias] : arthralgias [Joint Warmth] : no joint warmth [Difficulty Standing] : no difficulty standing [Muscle Spasms] : no muscle spasms [Eye Pain] : no eye pain [Eye Redness] : no eye redness [Dry Eyes] : no dry eyes

## 2022-05-26 NOTE — ASSESSMENT
[FreeTextEntry1] : 51 yo family h/o autoimmunity (SLE< RA< PsA) here with polyarthralgias\par \par #chronic trigger finger\par chronic ofr > 10 years.  has had steroids, NSAID, exercises and injections over the years without improvement.  has stiffness in the AM but only a few minutes - not prolonged. previous w/u for aiCTD was negative with normal inflammatory markers. MRI without synovitis.  presents a year later after surgery as still cannot fully flex the finger.  no new s/s of aiCTD have emerged\par -- s/p trigger release surgery of right middle and ring finger (March 2022)- will d/w dr del angel\par -- c/w OT\par -- Alializa cotto\par -- f/u with Ortho\par \par #Low positive RNP and CCP, family Hx of autoimmune dx, one episode of Raynauds?\par initial serologic workup negative for scl or lupus.  RNP and CCP low positive though and without pulmonary or muscle complaints/weakness- CPK/myomarker negative. PFTs normal. CT Chest negative for ILD.\par -- continue hand warming for now - no meds\par --  SADIA, C3, C4, DSDNA, STEFFEN, CK, RF, Sjogrens, ESR, CRP - stable.  small bump in ESR, but CRP negative and otherwise stable\par \par # Bone Health (menopause 2021)\par -- Dexa Bone Density (ordered) - T-2.4\par -- referral to Endocrine\par \par \par More than 50% of the encounter was spent counseling the patient on differential, workup, disease course and treatment/management.  Education was provided to the patient during this encounter.  All questions and concerns were addressed and answered.   The patient verbalized understanding and agreed to the plan. \par \par Patient has been instructed to call for an appointment if new symptoms develop.\par Patient has been instructed to make a followup appointment in 12 months.\par \par Time spent on the encounter included, but is not limited to, preparing to see the patient, obtaining and/or reviewing separately obtained history, performing the evaluation, counseling and educating, independently interpreting results with communication to patient, order placement, referring and/or communicating with other health professionals as described, and documenting clinical information in the electronic health record\par \par \par Time spent on the encounter included, but is not limited to, preparing to see the patient, obtaining and/or reviewing separately obtained history, performing the evaluation, counseling and educating, independently interpreting results with communication to patient, order placement, referring and/or communicating with other health professionals as described, and documenting clinical information in the electronic health record\par

## 2022-08-05 ENCOUNTER — APPOINTMENT (OUTPATIENT)
Dept: ENDOCRINOLOGY | Facility: CLINIC | Age: 52
End: 2022-08-05

## 2022-09-01 ENCOUNTER — NON-APPOINTMENT (OUTPATIENT)
Age: 52
End: 2022-09-01

## 2023-03-07 NOTE — REASON FOR VISIT
Attempted to contact patient regarding upcoming Colonoscopy  procedure on 3/21/23 for pre assessment questions. No answer.     Left message to return call to [phone] #4    Discuss Covid policy and designated  policy.    Pre op exam? N/A    Arrival time: 0615. Procedure time: 0730    Facility location: [facility]; [address], [place] [address]     Verify it patient is on dialysis or started. Per endoscopy scheduling encounter answered 'no' but it appears that patient had dialysis catheter placement 1/11/23.    Sedation type: Conscious sedation     Anticoagulants: No    Electronic implanted devices? No    Diabetic? No    Indication for procedure: screening colonoscopy    Bowel prep recommendation: Standard Golytely  d/t CKD 5    Bowel prep has not been sent to pharmacy until determined facility location.     Keara Banegas RN  Endoscopy Procedure Pre Assessment RN           [Post Operative Visit] : a post operative visit for Biopsy Method: double edge Personna blade

## 2023-04-24 NOTE — ASU PREOP CHECKLIST - DENTURES
Appointment for tomorrow confirmed. Informed pt of her arrival time and told her that she could bring one person that is an adult 18+ but no children. Also informed her of our location.    no

## 2023-08-02 ENCOUNTER — APPOINTMENT (OUTPATIENT)
Dept: INTERNAL MEDICINE | Facility: CLINIC | Age: 53
End: 2023-08-02

## 2023-11-10 ENCOUNTER — APPOINTMENT (OUTPATIENT)
Dept: ULTRASOUND IMAGING | Facility: CLINIC | Age: 53
End: 2023-11-10
Payer: COMMERCIAL

## 2023-11-10 ENCOUNTER — OUTPATIENT (OUTPATIENT)
Dept: OUTPATIENT SERVICES | Facility: HOSPITAL | Age: 53
LOS: 1 days | End: 2023-11-10
Payer: COMMERCIAL

## 2023-11-10 DIAGNOSIS — N95.0 POSTMENOPAUSAL BLEEDING: ICD-10-CM

## 2023-11-10 DIAGNOSIS — Z98.890 OTHER SPECIFIED POSTPROCEDURAL STATES: Chronic | ICD-10-CM

## 2023-11-10 PROCEDURE — 76830 TRANSVAGINAL US NON-OB: CPT

## 2023-11-10 PROCEDURE — 76856 US EXAM PELVIC COMPLETE: CPT

## 2023-11-10 PROCEDURE — 76856 US EXAM PELVIC COMPLETE: CPT | Mod: 26

## 2023-11-10 PROCEDURE — 76830 TRANSVAGINAL US NON-OB: CPT | Mod: 26

## 2023-12-14 NOTE — ASU PREOP CHECKLIST - ANTIBIOTIC
Body Location Override (Optional - Billing Will Still Be Based On Selected Body Map Location If Applicable): right radial mid forearm 5x6mm Detail Level: Detailed Depth Of Biopsy: dermis Was A Bandage Applied: Yes Size Of Lesion In Cm: 0 Biopsy Type: H and E Biopsy Method: Dermablade Anesthesia Type: 1% lidocaine with epinephrine Anesthesia Volume In Cc: 0.5 Hemostasis: Aluminum Chloride and Electrocautery Wound Care: Antibiotic ointment Dressing: bandage Destruction After The Procedure: No Type Of Destruction Used: Curettage Curettage Text: The wound bed was treated with curettage after the biopsy was performed. no (get order) Cryotherapy Text: The wound bed was treated with cryotherapy after the biopsy was performed. Electrodesiccation Text: The wound bed was treated with electrodesiccation after the biopsy was performed. Electrodesiccation And Curettage Text: The wound bed was treated with electrodesiccation and curettage after the biopsy was performed. Silver Nitrate Text: The wound bed was treated with silver nitrate after the biopsy was performed. Lab: [id number] Lab Facility: [id number] Consent: Written consent was obtained and risks were reviewed including but not limited to scarring, infection, bleeding, scabbing, incomplete removal, nerve damage and allergy to anesthesia. Post-Care Instructions: I reviewed with the patient in detail post-care instructions. Patient is to keep the biopsy site dry overnight, and then apply bacitracin twice daily until healed. Patient may apply hydrogen peroxide soaks to remove any crusting. Notification Instructions: Patient will be notified of biopsy results. However, patient instructed to call the office if not contacted within 2 weeks. Billing Type: Third-Party Bill Information: Selecting Yes will display possible errors in your note based on the variables you have selected. This validation is only offered as a suggestion for you. PLEASE NOTE THAT THE VALIDATION TEXT WILL BE REMOVED WHEN YOU FINALIZE YOUR NOTE. IF YOU WANT TO FAX A PRELIMINARY NOTE YOU WILL NEED TO TOGGLE THIS TO 'NO' IF YOU DO NOT WANT IT IN YOUR FAXED NOTE. Body Location Override (Optional - Billing Will Still Be Based On Selected Body Map Location If Applicable): right lateral forehead 4x5mm

## 2024-01-12 ENCOUNTER — NON-APPOINTMENT (OUTPATIENT)
Age: 54
End: 2024-01-12

## 2024-01-12 ENCOUNTER — APPOINTMENT (OUTPATIENT)
Dept: INTERNAL MEDICINE | Facility: CLINIC | Age: 54
End: 2024-01-12
Payer: COMMERCIAL

## 2024-01-12 VITALS
TEMPERATURE: 97.9 F | RESPIRATION RATE: 16 BRPM | DIASTOLIC BLOOD PRESSURE: 72 MMHG | BODY MASS INDEX: 27.31 KG/M2 | SYSTOLIC BLOOD PRESSURE: 122 MMHG | HEART RATE: 78 BPM | OXYGEN SATURATION: 99 % | WEIGHT: 160 LBS | HEIGHT: 64 IN

## 2024-01-12 DIAGNOSIS — Z00.00 ENCOUNTER FOR GENERAL ADULT MEDICAL EXAMINATION W/OUT ABNORMAL FINDINGS: ICD-10-CM

## 2024-01-12 PROCEDURE — 93000 ELECTROCARDIOGRAM COMPLETE: CPT

## 2024-01-12 PROCEDURE — 99396 PREV VISIT EST AGE 40-64: CPT | Mod: 25

## 2024-01-12 PROCEDURE — 99386 PREV VISIT NEW AGE 40-64: CPT | Mod: 25

## 2024-01-12 NOTE — HEALTH RISK ASSESSMENT
[Very Good] : ~his/her~  mood as very good [No falls in past year] : Patient reported no falls in the past year [0] : 2) Feeling down, depressed, or hopeless: Not at all (0) [PHQ-2 Negative - No further assessment needed] : PHQ-2 Negative - No further assessment needed [Patient reported PAP Smear was normal] : Patient reported PAP Smear was normal [Patient reported colonoscopy was normal] : Patient reported colonoscopy was normal [[id number]] : 0 [MammogramDate] : 03/23 [PapSmearDate] : 10/23 [ColonoscopyDate] : 2021

## 2024-01-17 LAB
25(OH)D3 SERPL-MCNC: 42 NG/ML
ALBUMIN SERPL ELPH-MCNC: 4.6 G/DL
ALP BLD-CCNC: 71 U/L
ALT SERPL-CCNC: 27 U/L
ANION GAP SERPL CALC-SCNC: 15 MMOL/L
APPEARANCE: CLEAR
AST SERPL-CCNC: 27 U/L
BACTERIA: NEGATIVE /HPF
BASOPHILS # BLD AUTO: 0.06 K/UL
BASOPHILS NFR BLD AUTO: 1.1 %
BILIRUB SERPL-MCNC: 0.6 MG/DL
BILIRUBIN URINE: NEGATIVE
BLOOD URINE: NEGATIVE
BUN SERPL-MCNC: 15 MG/DL
CALCIUM SERPL-MCNC: 9.6 MG/DL
CAST: 0 /LPF
CHLORIDE SERPL-SCNC: 99 MMOL/L
CHOLEST SERPL-MCNC: 291 MG/DL
CO2 SERPL-SCNC: 24 MMOL/L
COLOR: YELLOW
CREAT SERPL-MCNC: 0.65 MG/DL
EGFR: 104 ML/MIN/1.73M2
EOSINOPHIL # BLD AUTO: 0.19 K/UL
EOSINOPHIL NFR BLD AUTO: 3.6 %
EPITHELIAL CELLS: 3 /HPF
ESTIMATED AVERAGE GLUCOSE: 114 MG/DL
FOLATE SERPL-MCNC: 16.5 NG/ML
GLUCOSE QUALITATIVE U: NEGATIVE MG/DL
GLUCOSE SERPL-MCNC: 83 MG/DL
HBA1C MFR BLD HPLC: 5.6 %
HCT VFR BLD CALC: 40.1 %
HDLC SERPL-MCNC: 92 MG/DL
HGB BLD-MCNC: 13.5 G/DL
IMM GRANULOCYTES NFR BLD AUTO: 0.2 %
KETONES URINE: ABNORMAL MG/DL
LDLC SERPL CALC-MCNC: 193 MG/DL
LEUKOCYTE ESTERASE URINE: ABNORMAL
LYMPHOCYTES # BLD AUTO: 1.77 K/UL
LYMPHOCYTES NFR BLD AUTO: 33.1 %
MAN DIFF?: NORMAL
MCHC RBC-ENTMCNC: 31.3 PG
MCHC RBC-ENTMCNC: 33.7 GM/DL
MCV RBC AUTO: 93 FL
MICROSCOPIC-UA: NORMAL
MONOCYTES # BLD AUTO: 0.49 K/UL
MONOCYTES NFR BLD AUTO: 9.2 %
NEUTROPHILS # BLD AUTO: 2.82 K/UL
NEUTROPHILS NFR BLD AUTO: 52.8 %
NITRITE URINE: NEGATIVE
NONHDLC SERPL-MCNC: 199 MG/DL
PH URINE: 6
PLATELET # BLD AUTO: 393 K/UL
POTASSIUM SERPL-SCNC: 4.2 MMOL/L
PROT SERPL-MCNC: 7.1 G/DL
PROTEIN URINE: NEGATIVE MG/DL
RBC # BLD: 4.31 M/UL
RBC # FLD: 14.2 %
RED BLOOD CELLS URINE: 1 /HPF
SODIUM SERPL-SCNC: 138 MMOL/L
SPECIFIC GRAVITY URINE: 1.01
TRIGL SERPL-MCNC: 45 MG/DL
TSH SERPL-ACNC: 2.07 UIU/ML
UROBILINOGEN URINE: 0.2 MG/DL
VIT B12 SERPL-MCNC: 398 PG/ML
WBC # FLD AUTO: 5.34 K/UL
WHITE BLOOD CELLS URINE: 4 /HPF

## 2024-01-23 LAB — HEMOCCULT STL QL IA: NEGATIVE

## 2024-02-27 ENCOUNTER — OUTPATIENT (OUTPATIENT)
Dept: OUTPATIENT SERVICES | Facility: HOSPITAL | Age: 54
LOS: 1 days | End: 2024-02-27
Payer: SELF-PAY

## 2024-02-27 ENCOUNTER — APPOINTMENT (OUTPATIENT)
Dept: CT IMAGING | Facility: CLINIC | Age: 54
End: 2024-02-27
Payer: SELF-PAY

## 2024-02-27 DIAGNOSIS — Z98.890 OTHER SPECIFIED POSTPROCEDURAL STATES: Chronic | ICD-10-CM

## 2024-02-27 DIAGNOSIS — E78.5 HYPERLIPIDEMIA, UNSPECIFIED: ICD-10-CM

## 2024-02-27 PROCEDURE — 75571 CT HRT W/O DYE W/CA TEST: CPT

## 2024-02-27 PROCEDURE — 75571 CT HRT W/O DYE W/CA TEST: CPT | Mod: 26

## 2024-03-04 PROBLEM — I73.00 RAYNAUD'S DISEASE: Status: ACTIVE | Noted: 2021-02-11

## 2024-03-04 PROBLEM — D75.839 THROMBOCYTOSIS: Status: ACTIVE | Noted: 2021-03-19

## 2024-03-07 ENCOUNTER — APPOINTMENT (OUTPATIENT)
Dept: GASTROENTEROLOGY | Facility: CLINIC | Age: 54
End: 2024-03-07
Payer: COMMERCIAL

## 2024-03-07 VITALS — BODY MASS INDEX: 26.98 KG/M2 | WEIGHT: 158 LBS | HEIGHT: 64 IN

## 2024-03-07 DIAGNOSIS — I73.00 RAYNAUD'S SYNDROME W/OUT GANGRENE: ICD-10-CM

## 2024-03-07 DIAGNOSIS — Z12.11 ENCOUNTER FOR SCREENING FOR MALIGNANT NEOPLASM OF COLON: ICD-10-CM

## 2024-03-07 DIAGNOSIS — D75.839 THROMBOCYTOSIS, UNSPECIFIED: ICD-10-CM

## 2024-03-07 DIAGNOSIS — Z12.12 ENCOUNTER FOR SCREENING FOR MALIGNANT NEOPLASM OF COLON: ICD-10-CM

## 2024-03-07 DIAGNOSIS — E66.3 OVERWEIGHT: ICD-10-CM

## 2024-03-07 PROCEDURE — 99203 OFFICE O/P NEW LOW 30 MIN: CPT

## 2024-03-07 NOTE — HISTORY OF PRESENT ILLNESS
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  Feels well - says she had recent imaging of her heart vessels and plaque seen - will be seeing cardiology.   Patient denies having abdominal pain, constipation, diarrhea, loss of appetite, weight loss, melena and hematochezia.  Patient denies family history of gastrointestinal cancers. Sister may have had a colon polyp but not advanced.    Initial office visit 12/2020:  No colonoscopy in past.  No abdominal pain.  No constipation.  No diarrhea.  No change in bowel habits.  No melena and n hematochezia.  No loss of appetite, no loss of weight.  No heartburn.  No dysphagia.  No odynophagia.  No jaundice. Brother had Prostate cancer. Father had liver disease but unsure etiology. Patient denies family history of digestive cancers. Third toe on right foot painful. Has arthritis - painful joints, back pain. Feels stiff in joints All other review of systems are negative. Denies cardiac symptoms.

## 2024-03-07 NOTE — ASSESSMENT
[FreeTextEntry1] : IMPRESSION: #  Due for a screening test for colorectal cancer - 5 colon polyps less than 10 mm - only two were tubular adenomas -  FIT negative on 1/2024 -  Colonoscopy by Dr. Gee on 5/2021:  Diminutive ascending colon polyp removed (neg path).  6 mm descending colon polyp removed (TA). Three sigmoid colon polyps removed (TA x1).  Rpt in 3 yrs.  -  No digestive cancers in the family  #  Comorbidities: Hx of bladder tumor in her 30s, HLD, avascular necrosis of bone      PLAN: I have advised the patient to arrange for a colonoscopy to rule out colon polyps, colorectal cancer etc. under monitored anesthesia care.  Risks such as perforation requiring surgery, colostomy, bleeding requiring blood transfusion, infection/sepsis, diverticulitis, colitis, missed colon cancer (2% to 6%), internal organ injury such as splenic hemorrhage (1/6000, risk thin, female gender) etc, adverse reaction to medication etc. and risks of anesthesia including cardiopulmonary compromise requiring ICU care were discussed with patient.  Alternatives were discussed (CT colonography etc).  Patient verbalized understanding and agrees to proceed with a colonoscopy under anesthesia.  She will arrange after clearance from cardiologist.

## 2024-04-11 ENCOUNTER — APPOINTMENT (OUTPATIENT)
Dept: CARDIOLOGY | Facility: CLINIC | Age: 54
End: 2024-04-11

## 2024-04-11 NOTE — HISTORY OF PRESENT ILLNESS
[FreeTextEntry1] : Jocelyne presented to the office today for a cardiovascular evaluation.  She presents for the further evaluation of her risk factors, and in anticipation of colonoscopy.  She is now 54 years old, without a history of hypertension.  She has a history of hyperlipidemia,, with an LDL of over 190, and was recently started on a statin.  She does not have a history of diabetes.  She is not known to have any underlying structural heart disease.  At baseline, she tries to stay physically active.  With regular physical activities, she feels well, without reproducible chest discomfort or shortness of breath suggestive of angina.  She denies orthopnea, PND and lower extremity edema.  She denies palpitations, dizziness and syncope.  She has been in her usual state of health generally speaking.  She was recently evaluated by gastroenterology, and a routine screening colonoscopy was planned.  She presents to the office today in anticipation of that procedure.

## 2024-05-20 ENCOUNTER — APPOINTMENT (OUTPATIENT)
Dept: GASTROENTEROLOGY | Facility: HOSPITAL | Age: 54
End: 2024-05-20

## 2024-05-23 ENCOUNTER — APPOINTMENT (OUTPATIENT)
Dept: INTERNAL MEDICINE | Facility: CLINIC | Age: 54
End: 2024-05-23
Payer: COMMERCIAL

## 2024-05-23 VITALS
RESPIRATION RATE: 16 BRPM | OXYGEN SATURATION: 98 % | DIASTOLIC BLOOD PRESSURE: 70 MMHG | BODY MASS INDEX: 26.53 KG/M2 | HEART RATE: 74 BPM | TEMPERATURE: 98.3 F | WEIGHT: 155.4 LBS | HEIGHT: 64 IN | SYSTOLIC BLOOD PRESSURE: 112 MMHG

## 2024-05-23 DIAGNOSIS — E78.5 HYPERLIPIDEMIA, UNSPECIFIED: ICD-10-CM

## 2024-05-23 PROCEDURE — 99213 OFFICE O/P EST LOW 20 MIN: CPT

## 2024-05-23 RX ORDER — ATORVASTATIN CALCIUM 20 MG/1
20 TABLET, FILM COATED ORAL DAILY
Qty: 90 | Refills: 0 | Status: ACTIVE | COMMUNITY
Start: 1900-01-01 | End: 1900-01-01

## 2024-05-23 RX ORDER — SODIUM SULFATE, MAGNESIUM SULFATE, AND POTASSIUM CHLORIDE 17.75; 2.7; 2.25 G/1; G/1; G/1
1479-225-188 TABLET ORAL
Qty: 1 | Refills: 0 | Status: DISCONTINUED | COMMUNITY
Start: 2024-03-07 | End: 2024-05-23

## 2024-05-23 NOTE — HISTORY OF PRESENT ILLNESS
[de-identified] : Here today for follow up. Pt had followed up with cards. Had carotid, stress and echo- Reported normal Here today for follow up.

## 2024-05-24 LAB
ALBUMIN SERPL ELPH-MCNC: 4.8 G/DL
ALP BLD-CCNC: 75 U/L
ALT SERPL-CCNC: 27 U/L
ANION GAP SERPL CALC-SCNC: 15 MMOL/L
AST SERPL-CCNC: 27 U/L
BILIRUB SERPL-MCNC: 0.5 MG/DL
BUN SERPL-MCNC: 18 MG/DL
CALCIUM SERPL-MCNC: 9.9 MG/DL
CHLORIDE SERPL-SCNC: 98 MMOL/L
CHOLEST SERPL-MCNC: 211 MG/DL
CO2 SERPL-SCNC: 24 MMOL/L
CREAT SERPL-MCNC: 0.66 MG/DL
EGFR: 103 ML/MIN/1.73M2
ESTIMATED AVERAGE GLUCOSE: 114 MG/DL
GLUCOSE SERPL-MCNC: 81 MG/DL
HBA1C MFR BLD HPLC: 5.6 %
HDLC SERPL-MCNC: 85 MG/DL
LDLC SERPL CALC-MCNC: 109 MG/DL
NONHDLC SERPL-MCNC: 126 MG/DL
POTASSIUM SERPL-SCNC: 4.8 MMOL/L
PROT SERPL-MCNC: 7.5 G/DL
SODIUM SERPL-SCNC: 138 MMOL/L
TRIGL SERPL-MCNC: 99 MG/DL

## 2024-10-14 ENCOUNTER — RX RENEWAL (OUTPATIENT)
Age: 54
End: 2024-10-14

## 2024-11-06 ENCOUNTER — APPOINTMENT (OUTPATIENT)
Dept: INTERNAL MEDICINE | Facility: CLINIC | Age: 54
End: 2024-11-06
Payer: COMMERCIAL

## 2024-11-06 VITALS
RESPIRATION RATE: 16 BRPM | DIASTOLIC BLOOD PRESSURE: 72 MMHG | SYSTOLIC BLOOD PRESSURE: 118 MMHG | OXYGEN SATURATION: 98 % | WEIGHT: 155 LBS | BODY MASS INDEX: 26.46 KG/M2 | HEART RATE: 76 BPM | HEIGHT: 64 IN | TEMPERATURE: 98.2 F

## 2024-11-06 DIAGNOSIS — E78.5 HYPERLIPIDEMIA, UNSPECIFIED: ICD-10-CM

## 2024-11-06 PROCEDURE — G2211 COMPLEX E/M VISIT ADD ON: CPT | Mod: NC

## 2024-11-06 PROCEDURE — 99213 OFFICE O/P EST LOW 20 MIN: CPT

## 2024-11-07 LAB
ALBUMIN SERPL ELPH-MCNC: 4.7 G/DL
ALP BLD-CCNC: 81 U/L
ALT SERPL-CCNC: 23 U/L
ANION GAP SERPL CALC-SCNC: 13 MMOL/L
AST SERPL-CCNC: 23 U/L
BILIRUB SERPL-MCNC: 0.6 MG/DL
BUN SERPL-MCNC: 17 MG/DL
CALCIUM SERPL-MCNC: 9.8 MG/DL
CHLORIDE SERPL-SCNC: 100 MMOL/L
CHOLEST SERPL-MCNC: 223 MG/DL
CO2 SERPL-SCNC: 27 MMOL/L
CREAT SERPL-MCNC: 0.67 MG/DL
EGFR: 103 ML/MIN/1.73M2
ESTIMATED AVERAGE GLUCOSE: 114 MG/DL
GLUCOSE SERPL-MCNC: 87 MG/DL
HBA1C MFR BLD HPLC: 5.6 %
HDLC SERPL-MCNC: 84 MG/DL
LDLC SERPL CALC-MCNC: 128 MG/DL
NONHDLC SERPL-MCNC: 139 MG/DL
POTASSIUM SERPL-SCNC: 4.8 MMOL/L
PROT SERPL-MCNC: 7.5 G/DL
SODIUM SERPL-SCNC: 139 MMOL/L
TRIGL SERPL-MCNC: 62 MG/DL

## 2024-12-16 ENCOUNTER — OUTPATIENT (OUTPATIENT)
Dept: OUTPATIENT SERVICES | Facility: HOSPITAL | Age: 54
LOS: 1 days | End: 2024-12-16
Payer: COMMERCIAL

## 2024-12-16 ENCOUNTER — RESULT REVIEW (OUTPATIENT)
Age: 54
End: 2024-12-16

## 2024-12-16 ENCOUNTER — APPOINTMENT (OUTPATIENT)
Dept: GASTROENTEROLOGY | Facility: HOSPITAL | Age: 54
End: 2024-12-16

## 2024-12-16 ENCOUNTER — TRANSCRIPTION ENCOUNTER (OUTPATIENT)
Age: 54
End: 2024-12-16

## 2024-12-16 DIAGNOSIS — Z12.11 ENCOUNTER FOR SCREENING FOR MALIGNANT NEOPLASM OF COLON: ICD-10-CM

## 2024-12-16 DIAGNOSIS — Z12.12 ENCOUNTER FOR SCREENING FOR MALIGNANT NEOPLASM OF RECTUM: ICD-10-CM

## 2024-12-16 DIAGNOSIS — Z98.890 OTHER SPECIFIED POSTPROCEDURAL STATES: Chronic | ICD-10-CM

## 2024-12-16 PROCEDURE — 45385 COLONOSCOPY W/LESION REMOVAL: CPT

## 2024-12-16 PROCEDURE — 88305 TISSUE EXAM BY PATHOLOGIST: CPT

## 2024-12-16 PROCEDURE — 45385 COLONOSCOPY W/LESION REMOVAL: CPT | Mod: PT

## 2024-12-16 PROCEDURE — 88305 TISSUE EXAM BY PATHOLOGIST: CPT | Mod: 26

## 2024-12-16 DEVICE — CLIP RESOLUTION 360 235CM: Type: IMPLANTABLE DEVICE | Status: FUNCTIONAL

## 2024-12-16 DEVICE — HEMOSPRAY HEMOSTAT ENDO 7F: Type: IMPLANTABLE DEVICE | Status: FUNCTIONAL

## 2024-12-18 LAB — SURGICAL PATHOLOGY STUDY: SIGNIFICANT CHANGE UP

## 2025-01-03 ENCOUNTER — APPOINTMENT (OUTPATIENT)
Dept: INTERNAL MEDICINE | Facility: CLINIC | Age: 55
End: 2025-01-03
Payer: COMMERCIAL

## 2025-01-03 VITALS
RESPIRATION RATE: 16 BRPM | OXYGEN SATURATION: 98 % | SYSTOLIC BLOOD PRESSURE: 122 MMHG | HEIGHT: 64 IN | HEART RATE: 84 BPM | DIASTOLIC BLOOD PRESSURE: 74 MMHG | WEIGHT: 155 LBS | BODY MASS INDEX: 26.46 KG/M2 | TEMPERATURE: 97.7 F

## 2025-01-03 DIAGNOSIS — R09.81 NASAL CONGESTION: ICD-10-CM

## 2025-01-03 DIAGNOSIS — J01.00 ACUTE MAXILLARY SINUSITIS, UNSPECIFIED: ICD-10-CM

## 2025-01-03 LAB — SARS-COV-2 AG RESP QL IA.RAPID: NEGATIVE

## 2025-01-03 PROCEDURE — 99214 OFFICE O/P EST MOD 30 MIN: CPT | Mod: 25

## 2025-01-03 PROCEDURE — 87811 SARS-COV-2 COVID19 W/OPTIC: CPT | Mod: QW

## 2025-01-03 RX ORDER — AMOXICILLIN AND CLAVULANATE POTASSIUM 875; 125 MG/1; MG/1
875-125 TABLET, COATED ORAL
Qty: 20 | Refills: 0 | Status: ACTIVE | COMMUNITY
Start: 2025-01-03 | End: 1900-01-01

## 2025-01-03 RX ORDER — FLUTICASONE PROPIONATE 50 UG/1
50 SPRAY, METERED NASAL DAILY
Qty: 1 | Refills: 2 | Status: ACTIVE | COMMUNITY
Start: 2025-01-03 | End: 1900-01-01

## 2025-07-29 ENCOUNTER — APPOINTMENT (OUTPATIENT)
Dept: INTERNAL MEDICINE | Facility: CLINIC | Age: 55
End: 2025-07-29

## (undated) DEVICE — CANISTER SUCTION 1200CC 10/SL

## (undated) DEVICE — Device

## (undated) DEVICE — GLV 8.5 PROTEXIS (WHITE)

## (undated) DEVICE — MASK O2 NON REBREATH 3IN1 ADULT

## (undated) DEVICE — TOURNIQUET CUFF 18" DUAL PORT SINGLE BLADDER W PLC  (BLACK)

## (undated) DEVICE — TOURNIQUET CUFF 12" DUAL PORT W PLC

## (undated) DEVICE — ELCTR GROUNDING PAD ADULT COVIDIEN

## (undated) DEVICE — SPECIMEN CONTAINER 100ML

## (undated) DEVICE — SYR IV POSIFLUSH NS 3ML 30/TY

## (undated) DEVICE — FORMALIN CUPS 10% BUFFERED

## (undated) DEVICE — WARMING BLANKET LOWER ADULT

## (undated) DEVICE — MASK LRG MED AND HIGH O2 CONC M TO M 10FT

## (undated) DEVICE — TUBING SUCTION CONN 6FT STERILE

## (undated) DEVICE — MARKER ENDO SPOT EX

## (undated) DEVICE — PACK IV START WITH CHG

## (undated) DEVICE — TUBING IV SET SECONDARY 34"

## (undated) DEVICE — SNARE CAPTIVATOR II RND COLD 10MM

## (undated) DEVICE — TUBING IV SET GRAVITY 3Y 100" MACRO

## (undated) DEVICE — NDL INJ SCLERO INTERJECT 23G

## (undated) DEVICE — STERIS DEFENDO 3-PIECE KIT (AIR/WATER, SUCTION & BIOPSY VALVES)

## (undated) DEVICE — SUT MONOSOF 5-0 18" P-13

## (undated) DEVICE — SOL IRR POUR H2O 500ML

## (undated) DEVICE — POLY TRAP ETRAP

## (undated) DEVICE — SUT HEWSON RETRIEVER

## (undated) DEVICE — PREP CHLOROHEXIDINE 4% 118CC KIT

## (undated) DEVICE — TUBING CANNULA SALTER LABS NASAL ADULT 7FT

## (undated) DEVICE — TUBE RECTAL 24FR

## (undated) DEVICE — CATH IV SAFE BC 20G X 1.16" (PINK)

## (undated) DEVICE — RETRIEVER ROTH NET PLATINUM-UNIVERSAL

## (undated) DEVICE — SYR LUER SLIP TIP 50CC

## (undated) DEVICE — SENSOR O2 FINGER XL ADULT 24/BX 6BX/CA

## (undated) DEVICE — BRUSH COLONOSCOPY CYTOLOGY

## (undated) DEVICE — TRAP QUICK CATCH  SINGL CHAMBER

## (undated) DEVICE — ENDOCUFF VISION SZ 3 SM PRPL

## (undated) DEVICE — SNARE LRG

## (undated) DEVICE — CATH IV SAFE BC 22G X 1" (BLUE)

## (undated) DEVICE — MEDICATION LABELS W MARKER

## (undated) DEVICE — SYR LUER SLIP TIP 30CC

## (undated) DEVICE — FORCEP RADIAL JAW 4 JUMBO 2.8MM 3.2MM 240CM ORANGE DISP

## (undated) DEVICE — ENDOCUFF VISION SZ 2 LG GRN

## (undated) DEVICE — SUCTION YANKAUER TAPERED BULBOUS NO VENT

## (undated) DEVICE — SUT POLYSORB 4-0 18" P-13 UNDYED

## (undated) DEVICE — POSITIONER FOAM EGG CRATE ULNAR 2PCS (PINK)

## (undated) DEVICE — FORCEP RADIAL JAW 4 W NDL 2.4MM 2.8MM 240CM ORANGE DISP

## (undated) DEVICE — GLV 8 PROTEXIS (WHITE)

## (undated) DEVICE — SLV COMPRESSION KNEE MED

## (undated) DEVICE — VALVE BIOPSY

## (undated) DEVICE — TUBE O2 SUPL CRUSH RESIS CONN SOUTHSIDE ONLY

## (undated) DEVICE — SNARE POLYP SENS 27MM 240CM

## (undated) DEVICE — DRAPE TOWEL BLUE 17" X 24"

## (undated) DEVICE — PACK HAND

## (undated) DEVICE — BLADE SCALPEL SAFETYLOCK #15

## (undated) DEVICE — SOL IRR POUR NS 0.9% 500ML